# Patient Record
Sex: FEMALE | Race: WHITE | NOT HISPANIC OR LATINO | Employment: FULL TIME | URBAN - METROPOLITAN AREA
[De-identification: names, ages, dates, MRNs, and addresses within clinical notes are randomized per-mention and may not be internally consistent; named-entity substitution may affect disease eponyms.]

---

## 2018-01-18 ENCOUNTER — HOSPITAL ENCOUNTER (OUTPATIENT)
Facility: HOSPITAL | Age: 33
Discharge: HOME | DRG: 661 | End: 2018-01-20
Attending: EMERGENCY MEDICINE | Admitting: HOSPITALIST
Payer: COMMERCIAL

## 2018-01-18 DIAGNOSIS — N13.9 ACUTE UNILATERAL OBSTRUCTIVE UROPATHY: Primary | ICD-10-CM

## 2018-01-18 DIAGNOSIS — N23 RENAL COLIC: ICD-10-CM

## 2018-01-18 PROBLEM — N39.0 UTI (URINARY TRACT INFECTION): Status: ACTIVE | Noted: 2018-01-18

## 2018-01-18 LAB
ALBUMIN SERPL BCP-MCNC: 3.8 G/DL
ALP SERPL-CCNC: 60 U/L
ALT SERPL W/O P-5'-P-CCNC: 9 U/L
ANION GAP SERPL CALC-SCNC: 9 MMOL/L
AST SERPL-CCNC: 13 U/L
B-HCG UR QL: NEGATIVE
BACTERIA #/AREA URNS HPF: ABNORMAL /HPF
BASOPHILS # BLD AUTO: 0 K/UL
BASOPHILS NFR BLD: 0.4 %
BILIRUB SERPL-MCNC: 0.4 MG/DL
BILIRUB UR QL STRIP: NEGATIVE
BUN SERPL-MCNC: 10 MG/DL
CALCIUM SERPL-MCNC: 8.9 MG/DL
CHLORIDE SERPL-SCNC: 108 MMOL/L
CLARITY UR: ABNORMAL
CO2 SERPL-SCNC: 20 MMOL/L
COLOR UR: ABNORMAL
CREAT SERPL-MCNC: 0.9 MG/DL
CTP QC/QA: YES
DIFFERENTIAL METHOD: NORMAL
EOSINOPHIL # BLD AUTO: 0.5 K/UL
EOSINOPHIL NFR BLD: 5.3 %
ERYTHROCYTE [DISTWIDTH] IN BLOOD BY AUTOMATED COUNT: 13.3 %
EST. GFR  (AFRICAN AMERICAN): >60 ML/MIN/1.73 M^2
EST. GFR  (NON AFRICAN AMERICAN): >60 ML/MIN/1.73 M^2
GLUCOSE SERPL-MCNC: 99 MG/DL
GLUCOSE UR QL STRIP: NEGATIVE
HCT VFR BLD AUTO: 38.5 %
HGB BLD-MCNC: 12.9 G/DL
HGB UR QL STRIP: ABNORMAL
HYALINE CASTS #/AREA URNS LPF: 0 /LPF
KETONES UR QL STRIP: ABNORMAL
LEUKOCYTE ESTERASE UR QL STRIP: ABNORMAL
LYMPHOCYTES # BLD AUTO: 2 K/UL
LYMPHOCYTES NFR BLD: 21.1 %
MCH RBC QN AUTO: 30.9 PG
MCHC RBC AUTO-ENTMCNC: 33.5 G/DL
MCV RBC AUTO: 92 FL
MICROSCOPIC COMMENT: ABNORMAL
MONOCYTES # BLD AUTO: 0.5 K/UL
MONOCYTES NFR BLD: 4.7 %
NEUTROPHILS # BLD AUTO: 6.7 K/UL
NEUTROPHILS NFR BLD: 68.5 %
NITRITE UR QL STRIP: POSITIVE
PH UR STRIP: 7 [PH] (ref 5–8)
PLATELET # BLD AUTO: 297 K/UL
PMV BLD AUTO: 9.9 FL
POTASSIUM SERPL-SCNC: 3.6 MMOL/L
PROT SERPL-MCNC: 7.4 G/DL
PROT UR QL STRIP: ABNORMAL
RBC # BLD AUTO: 4.18 M/UL
RBC #/AREA URNS HPF: >100 /HPF (ref 0–4)
SODIUM SERPL-SCNC: 137 MMOL/L
SP GR UR STRIP: 1.02 (ref 1–1.03)
URN SPEC COLLECT METH UR: ABNORMAL
UROBILINOGEN UR STRIP-ACNC: ABNORMAL EU/DL
WBC # BLD AUTO: 9.7 K/UL
WBC #/AREA URNS HPF: 20 /HPF (ref 0–5)

## 2018-01-18 PROCEDURE — 96375 TX/PRO/DX INJ NEW DRUG ADDON: CPT

## 2018-01-18 PROCEDURE — G0378 HOSPITAL OBSERVATION PER HR: HCPCS

## 2018-01-18 PROCEDURE — 80053 COMPREHEN METABOLIC PANEL: CPT

## 2018-01-18 PROCEDURE — 81025 URINE PREGNANCY TEST: CPT | Performed by: EMERGENCY MEDICINE

## 2018-01-18 PROCEDURE — 99222 1ST HOSP IP/OBS MODERATE 55: CPT | Mod: ,,, | Performed by: UROLOGY

## 2018-01-18 PROCEDURE — 63600175 PHARM REV CODE 636 W HCPCS: Performed by: NURSE PRACTITIONER

## 2018-01-18 PROCEDURE — 25000003 PHARM REV CODE 250: Performed by: NURSE PRACTITIONER

## 2018-01-18 PROCEDURE — 25000003 PHARM REV CODE 250: Performed by: EMERGENCY MEDICINE

## 2018-01-18 PROCEDURE — 96365 THER/PROPH/DIAG IV INF INIT: CPT

## 2018-01-18 PROCEDURE — 96376 TX/PRO/DX INJ SAME DRUG ADON: CPT

## 2018-01-18 PROCEDURE — 12000002 HC ACUTE/MED SURGE SEMI-PRIVATE ROOM

## 2018-01-18 PROCEDURE — 99285 EMERGENCY DEPT VISIT HI MDM: CPT | Mod: 25

## 2018-01-18 PROCEDURE — 36415 COLL VENOUS BLD VENIPUNCTURE: CPT

## 2018-01-18 PROCEDURE — 85025 COMPLETE CBC W/AUTO DIFF WBC: CPT

## 2018-01-18 PROCEDURE — 87086 URINE CULTURE/COLONY COUNT: CPT

## 2018-01-18 PROCEDURE — 96361 HYDRATE IV INFUSION ADD-ON: CPT

## 2018-01-18 PROCEDURE — 63600175 PHARM REV CODE 636 W HCPCS: Performed by: EMERGENCY MEDICINE

## 2018-01-18 PROCEDURE — 96367 TX/PROPH/DG ADDL SEQ IV INF: CPT

## 2018-01-18 PROCEDURE — 81000 URINALYSIS NONAUTO W/SCOPE: CPT

## 2018-01-18 RX ORDER — SODIUM CHLORIDE 9 MG/ML
INJECTION, SOLUTION INTRAVENOUS CONTINUOUS
Status: DISCONTINUED | OUTPATIENT
Start: 2018-01-18 | End: 2018-01-20 | Stop reason: HOSPADM

## 2018-01-18 RX ORDER — CEFTRIAXONE 2 G/50ML
2 INJECTION, SOLUTION INTRAVENOUS
Status: COMPLETED | OUTPATIENT
Start: 2018-01-18 | End: 2018-01-18

## 2018-01-18 RX ORDER — KETOROLAC TROMETHAMINE 30 MG/ML
15 INJECTION, SOLUTION INTRAMUSCULAR; INTRAVENOUS
Status: COMPLETED | OUTPATIENT
Start: 2018-01-18 | End: 2018-01-18

## 2018-01-18 RX ORDER — TAMSULOSIN HYDROCHLORIDE 0.4 MG/1
0.4 CAPSULE ORAL
Status: COMPLETED | OUTPATIENT
Start: 2018-01-18 | End: 2018-01-18

## 2018-01-18 RX ORDER — ONDANSETRON 2 MG/ML
4 INJECTION INTRAMUSCULAR; INTRAVENOUS EVERY 6 HOURS PRN
Status: DISCONTINUED | OUTPATIENT
Start: 2018-01-18 | End: 2018-01-20 | Stop reason: HOSPADM

## 2018-01-18 RX ORDER — HYDROMORPHONE HYDROCHLORIDE 2 MG/ML
1 INJECTION, SOLUTION INTRAMUSCULAR; INTRAVENOUS; SUBCUTANEOUS
Status: COMPLETED | OUTPATIENT
Start: 2018-01-18 | End: 2018-01-18

## 2018-01-18 RX ORDER — ONDANSETRON 2 MG/ML
4 INJECTION INTRAMUSCULAR; INTRAVENOUS
Status: COMPLETED | OUTPATIENT
Start: 2018-01-18 | End: 2018-01-18

## 2018-01-18 RX ORDER — KETOROLAC TROMETHAMINE 30 MG/ML
15 INJECTION, SOLUTION INTRAMUSCULAR; INTRAVENOUS EVERY 6 HOURS PRN
Status: DISCONTINUED | OUTPATIENT
Start: 2018-01-18 | End: 2018-01-20 | Stop reason: HOSPADM

## 2018-01-18 RX ORDER — ZOLPIDEM TARTRATE 5 MG/1
5 TABLET ORAL NIGHTLY PRN
Status: DISCONTINUED | OUTPATIENT
Start: 2018-01-18 | End: 2018-01-20 | Stop reason: HOSPADM

## 2018-01-18 RX ORDER — ACETAMINOPHEN 325 MG/1
650 TABLET ORAL EVERY 4 HOURS PRN
Status: DISCONTINUED | OUTPATIENT
Start: 2018-01-18 | End: 2018-01-20 | Stop reason: HOSPADM

## 2018-01-18 RX ORDER — KETOROLAC TROMETHAMINE 30 MG/ML
30 INJECTION, SOLUTION INTRAMUSCULAR; INTRAVENOUS ONCE
Status: COMPLETED | OUTPATIENT
Start: 2018-01-18 | End: 2018-01-18

## 2018-01-18 RX ORDER — KETOROLAC TROMETHAMINE 30 MG/ML
30 INJECTION, SOLUTION INTRAMUSCULAR; INTRAVENOUS EVERY 6 HOURS PRN
Status: DISCONTINUED | OUTPATIENT
Start: 2018-01-18 | End: 2018-01-20 | Stop reason: HOSPADM

## 2018-01-18 RX ADMIN — SODIUM CHLORIDE 1000 ML: 0.9 INJECTION, SOLUTION INTRAVENOUS at 05:01

## 2018-01-18 RX ADMIN — SODIUM CHLORIDE: 0.9 INJECTION, SOLUTION INTRAVENOUS at 10:01

## 2018-01-18 RX ADMIN — KETOROLAC TROMETHAMINE 15 MG: 30 INJECTION, SOLUTION INTRAMUSCULAR at 06:01

## 2018-01-18 RX ADMIN — HYDROMORPHONE HYDROCHLORIDE 1 MG: 2 INJECTION INTRAMUSCULAR; INTRAVENOUS; SUBCUTANEOUS at 05:01

## 2018-01-18 RX ADMIN — KETOROLAC TROMETHAMINE 30 MG: 30 INJECTION, SOLUTION INTRAMUSCULAR at 09:01

## 2018-01-18 RX ADMIN — TAMSULOSIN HYDROCHLORIDE 0.4 MG: 0.4 CAPSULE ORAL at 06:01

## 2018-01-18 RX ADMIN — ONDANSETRON 4 MG: 2 INJECTION INTRAMUSCULAR; INTRAVENOUS at 05:01

## 2018-01-18 RX ADMIN — CEFTRIAXONE 2 G: 2 INJECTION, SOLUTION INTRAVENOUS at 07:01

## 2018-01-18 RX ADMIN — PROMETHAZINE HYDROCHLORIDE 25 MG: 25 INJECTION INTRAMUSCULAR; INTRAVENOUS at 09:01

## 2018-01-18 NOTE — ED PROVIDER NOTES
"Encounter Date: 1/18/2018    SCRIBE #1 NOTE: I, Renetta Ragsdale, am scribing for, and in the presence of,  Dr. Montenegro. I have scribed the entire note.       History     Chief Complaint   Patient presents with    Flank Pain     right side pain started 45 min ago        01/18/2018 5:35 PM     Chief complaint: Right sided flank pain      Tona Cha is a 32 y.o. female with a history of kidney stones who presents to the ED with an onset of right sided flank pain with associated nausea and vomiting that began "45 minutes" PTA. She reports that "2 weeks ago," she experienced abdominal pain but this resolved itself; however, "today something popped" and her flank pain started. The patient thinks that she has a kidney stone. She states that her last kidney stone was "about 3 years ago." Patient reports that she does not drink, do drugs, or smoke. Also, she is not pregnant and is not breast feeding. The patient denies having a fever. NKDA. There are no exacerbating or alleviating factors for her symptoms. No PSHx noted.     HPI limited by active vomiting.       The history is provided by the patient. The history is limited by the condition of the patient.     Review of patient's allergies indicates:  No Known Allergies  Past Medical History:   Diagnosis Date    Renal disorder     kidney stones     No past surgical history on file.  No family history on file.  Social History   Substance Use Topics    Smoking status: Current Every Day Smoker     Packs/day: 0.50    Smokeless tobacco: Not on file    Alcohol use Yes      Comment: occasional     Review of Systems   Unable to perform ROS: Other (active vomiting)   Constitutional: Negative for fever.   Gastrointestinal: Positive for nausea and vomiting.   Genitourinary: Positive for flank pain (right sided).       Physical Exam     Initial Vitals [01/18/18 1714]   BP Pulse Resp Temp SpO2   119/65 83 20 97.9 °F (36.6 °C) 100 %      MAP       83         Physical " Exam    Nursing note and vitals reviewed.  Constitutional: She appears well-developed and well-nourished. She is not diaphoretic. She appears distressed (vomiting).   HENT:   Head: Normocephalic and atraumatic.   Eyes: EOM are normal.   Neck: Normal range of motion. Neck supple.   Cardiovascular: Normal rate, regular rhythm and normal heart sounds. Exam reveals no gallop and no friction rub.    No murmur heard.  Pulmonary/Chest: Breath sounds normal. No respiratory distress. She has no wheezes. She has no rhonchi. She has no rales.   Abdominal: She exhibits no distension. There is no tenderness. There is no rebound.   Musculoskeletal: Normal range of motion.   Neurological: She is alert and oriented to person, place, and time.   Skin: Skin is warm and dry.   Psychiatric: She has a normal mood and affect. Her behavior is normal. Judgment and thought content normal.         ED Course   Procedures  Labs Reviewed   COMPREHENSIVE METABOLIC PANEL - Abnormal; Notable for the following:        Result Value    CO2 20 (*)     ALT 9 (*)     All other components within normal limits   URINALYSIS - Abnormal; Notable for the following:     Color, UA Red (*)     Appearance, UA Cloudy (*)     Protein, UA 3+ (*)     Ketones, UA 1+ (*)     Occult Blood UA 3+ (*)     Nitrite, UA Positive (*)     Urobilinogen, UA 4.0-6.0 (*)     Leukocytes, UA 2+ (*)     All other components within normal limits   URINALYSIS MICROSCOPIC - Abnormal; Notable for the following:     RBC, UA >100 (*)     WBC, UA 20 (*)     All other components within normal limits   POCT URINE PREGNANCY - Normal   CULTURE, URINE   CBC W/ AUTO DIFFERENTIAL        Imaging Results          CT Renal Stone Study ABD Pelvis WO (In process)                X-Ray Abdomen AP 1 View (KUB) (In process)                    Medical Decision Making:   History:   Old Medical Records: I decided to obtain old medical records.  Clinical Tests:   Lab Tests: Reviewed and Ordered  Radiological  Study: Reviewed and Ordered            Scribe Attestation:   Scribe #1: I performed the above scribed service and the documentation accurately describes the services I performed. I attest to the accuracy of the note.    I, Dr. Montenegro, personally performed the services described in this documentation. All medical record entries made by the scribe were at my direction and in my presence.  I have reviewed the chart and agree that the record reflects my personal performance and is accurate and complete.10:50 PM 01/18/2018            ED Course as of Jan 18 2117   Thu Jan 18, 2018 1910 Pain gone after Toradol  [EF]   1910 Nitrite, UA: (!) Positive [EF]   1910 Urobilinogen, UA: (!) 4.0-6.0 [EF]   1910 Leukocytes, UA: (!) 2+ [EF]   1911 Urine appears infected, patient will be given IV Rocephin and a CT scan will be obtained  [EF]   2016 Case discussed with Dr. Barber urology who will see her in the emergency room since he is here seeing another patient.  [EF]   2036 Kidneys and ureters: Obstructing 0.5 x 0.6 cm stone within the distal right ureter, resulting in mild  hydroureteronephrosis  [EF]   2039 [facility] medicine to admit patient, nothing by mouth after midnight, seen by urology in the ER  [EF]      ED Course User Index  [EF] Toby Montenegro MD     Clinical Impression:     1. Renal colic          Disposition:   Disposition: Admitted         32-year-old with a history of kidney stone presents to the ER with right flank pain.  Vomiting on arrival, pain resolved after Toradol.  Seen by Dr. Barber in the emergency room.  Patient has an obstructing kidney stone which is infected, no sign of sepsis.  She'll be admitted to hospital medicine.               Toby Montenegro MD  01/18/18 8653

## 2018-01-19 ENCOUNTER — ANESTHESIA (OUTPATIENT)
Dept: SURGERY | Facility: HOSPITAL | Age: 33
DRG: 661 | End: 2018-01-19
Payer: COMMERCIAL

## 2018-01-19 ENCOUNTER — ANESTHESIA EVENT (OUTPATIENT)
Dept: SURGERY | Facility: HOSPITAL | Age: 33
DRG: 661 | End: 2018-01-19
Payer: COMMERCIAL

## 2018-01-19 LAB
ANION GAP SERPL CALC-SCNC: 6 MMOL/L
BACTERIA UR CULT: NORMAL
BACTERIA UR CULT: NORMAL
BASOPHILS # BLD AUTO: 0.1 K/UL
BASOPHILS NFR BLD: 0.9 %
BUN SERPL-MCNC: 12 MG/DL
CALCIUM SERPL-MCNC: 8.1 MG/DL
CHLORIDE SERPL-SCNC: 111 MMOL/L
CO2 SERPL-SCNC: 22 MMOL/L
CREAT SERPL-MCNC: 0.9 MG/DL
DIFFERENTIAL METHOD: ABNORMAL
EOSINOPHIL # BLD AUTO: 0.4 K/UL
EOSINOPHIL NFR BLD: 4.7 %
ERYTHROCYTE [DISTWIDTH] IN BLOOD BY AUTOMATED COUNT: 13 %
EST. GFR  (AFRICAN AMERICAN): >60 ML/MIN/1.73 M^2
EST. GFR  (NON AFRICAN AMERICAN): >60 ML/MIN/1.73 M^2
GLUCOSE SERPL-MCNC: 95 MG/DL
HCT VFR BLD AUTO: 35.1 %
HGB BLD-MCNC: 12.1 G/DL
LYMPHOCYTES # BLD AUTO: 1.9 K/UL
LYMPHOCYTES NFR BLD: 21.7 %
MCH RBC QN AUTO: 31.9 PG
MCHC RBC AUTO-ENTMCNC: 34.4 G/DL
MCV RBC AUTO: 93 FL
MONOCYTES # BLD AUTO: 0.7 K/UL
MONOCYTES NFR BLD: 8.1 %
NEUTROPHILS # BLD AUTO: 5.7 K/UL
NEUTROPHILS NFR BLD: 64.6 %
PLATELET # BLD AUTO: 287 K/UL
PMV BLD AUTO: 10 FL
POTASSIUM SERPL-SCNC: 4 MMOL/L
RBC # BLD AUTO: 3.78 M/UL
SODIUM SERPL-SCNC: 139 MMOL/L
WBC # BLD AUTO: 8.8 K/UL

## 2018-01-19 PROCEDURE — 36415 COLL VENOUS BLD VENIPUNCTURE: CPT

## 2018-01-19 PROCEDURE — 52352 CYSTOURETERO W/STONE REMOVE: CPT | Mod: RT,,, | Performed by: UROLOGY

## 2018-01-19 PROCEDURE — G0378 HOSPITAL OBSERVATION PER HR: HCPCS

## 2018-01-19 PROCEDURE — 63600175 PHARM REV CODE 636 W HCPCS: Performed by: NURSE PRACTITIONER

## 2018-01-19 PROCEDURE — 25000003 PHARM REV CODE 250: Performed by: UROLOGY

## 2018-01-19 PROCEDURE — 80048 BASIC METABOLIC PNL TOTAL CA: CPT

## 2018-01-19 PROCEDURE — C1758 CATHETER, URETERAL: HCPCS | Performed by: UROLOGY

## 2018-01-19 PROCEDURE — 63600175 PHARM REV CODE 636 W HCPCS: Performed by: UROLOGY

## 2018-01-19 PROCEDURE — 99900103 DSU ONLY-NO CHARGE-INITIAL HR (STAT): Performed by: UROLOGY

## 2018-01-19 PROCEDURE — 36000707: Performed by: UROLOGY

## 2018-01-19 PROCEDURE — 82365 CALCULUS SPECTROSCOPY: CPT

## 2018-01-19 PROCEDURE — 37000009 HC ANESTHESIA EA ADD 15 MINS: Performed by: UROLOGY

## 2018-01-19 PROCEDURE — 71000033 HC RECOVERY, INTIAL HOUR: Performed by: UROLOGY

## 2018-01-19 PROCEDURE — C1769 GUIDE WIRE: HCPCS | Performed by: UROLOGY

## 2018-01-19 PROCEDURE — 12000002 HC ACUTE/MED SURGE SEMI-PRIVATE ROOM

## 2018-01-19 PROCEDURE — 74420 UROGRAPHY RTRGR +-KUB: CPT | Mod: 26,,, | Performed by: UROLOGY

## 2018-01-19 PROCEDURE — 85025 COMPLETE CBC W/AUTO DIFF WBC: CPT

## 2018-01-19 PROCEDURE — C1773 RET DEV, INSERTABLE: HCPCS | Performed by: UROLOGY

## 2018-01-19 PROCEDURE — 63600175 PHARM REV CODE 636 W HCPCS: Performed by: NURSE ANESTHETIST, CERTIFIED REGISTERED

## 2018-01-19 PROCEDURE — D9220A PRA ANESTHESIA: Mod: ANES,,, | Performed by: ANESTHESIOLOGY

## 2018-01-19 PROCEDURE — 71000039 HC RECOVERY, EACH ADD'L HOUR: Performed by: UROLOGY

## 2018-01-19 PROCEDURE — 37000008 HC ANESTHESIA 1ST 15 MINUTES: Performed by: UROLOGY

## 2018-01-19 PROCEDURE — 25000003 PHARM REV CODE 250: Performed by: ANESTHESIOLOGY

## 2018-01-19 PROCEDURE — 25500020 PHARM REV CODE 255: Performed by: UROLOGY

## 2018-01-19 PROCEDURE — 25000003 PHARM REV CODE 250: Performed by: NURSE PRACTITIONER

## 2018-01-19 PROCEDURE — 27200651 HC AIRWAY, LMA: Performed by: NURSE ANESTHETIST, CERTIFIED REGISTERED

## 2018-01-19 PROCEDURE — 99900104 DSU ONLY-NO CHARGE-EA ADD'L HR (STAT): Performed by: UROLOGY

## 2018-01-19 PROCEDURE — C1726 CATH, BAL DIL, NON-VASCULAR: HCPCS | Performed by: UROLOGY

## 2018-01-19 PROCEDURE — 36000706: Performed by: UROLOGY

## 2018-01-19 PROCEDURE — D9220A PRA ANESTHESIA: Mod: CRNA,,, | Performed by: NURSE ANESTHETIST, CERTIFIED REGISTERED

## 2018-01-19 RX ORDER — FENTANYL CITRATE 50 UG/ML
INJECTION, SOLUTION INTRAMUSCULAR; INTRAVENOUS
Status: DISCONTINUED | OUTPATIENT
Start: 2018-01-19 | End: 2018-01-19

## 2018-01-19 RX ORDER — ONDANSETRON 2 MG/ML
INJECTION INTRAMUSCULAR; INTRAVENOUS
Status: DISCONTINUED | OUTPATIENT
Start: 2018-01-19 | End: 2018-01-19

## 2018-01-19 RX ORDER — LIDOCAINE HYDROCHLORIDE 20 MG/ML
JELLY TOPICAL
Status: DISCONTINUED | OUTPATIENT
Start: 2018-01-19 | End: 2018-01-19 | Stop reason: HOSPADM

## 2018-01-19 RX ORDER — SODIUM CHLORIDE, SODIUM LACTATE, POTASSIUM CHLORIDE, CALCIUM CHLORIDE 600; 310; 30; 20 MG/100ML; MG/100ML; MG/100ML; MG/100ML
INJECTION, SOLUTION INTRAVENOUS CONTINUOUS
Status: DISCONTINUED | OUTPATIENT
Start: 2018-01-19 | End: 2018-01-20

## 2018-01-19 RX ORDER — DEXAMETHASONE SODIUM PHOSPHATE 4 MG/ML
INJECTION, SOLUTION INTRA-ARTICULAR; INTRALESIONAL; INTRAMUSCULAR; INTRAVENOUS; SOFT TISSUE
Status: DISCONTINUED | OUTPATIENT
Start: 2018-01-19 | End: 2018-01-19

## 2018-01-19 RX ORDER — LIDOCAINE HYDROCHLORIDE 10 MG/ML
5 INJECTION, SOLUTION EPIDURAL; INFILTRATION; INTRACAUDAL; PERINEURAL ONCE
Status: DISCONTINUED | OUTPATIENT
Start: 2018-01-19 | End: 2018-01-19 | Stop reason: HOSPADM

## 2018-01-19 RX ORDER — PROPOFOL 10 MG/ML
VIAL (ML) INTRAVENOUS
Status: DISCONTINUED | OUTPATIENT
Start: 2018-01-19 | End: 2018-01-19

## 2018-01-19 RX ORDER — IODIXANOL 320 MG/ML
INJECTION, SOLUTION INTRAVASCULAR
Status: DISCONTINUED | OUTPATIENT
Start: 2018-01-19 | End: 2018-01-19 | Stop reason: HOSPADM

## 2018-01-19 RX ORDER — OXYCODONE HYDROCHLORIDE 5 MG/1
5 TABLET ORAL ONCE AS NEEDED
Status: DISCONTINUED | OUTPATIENT
Start: 2018-01-19 | End: 2018-01-19 | Stop reason: HOSPADM

## 2018-01-19 RX ORDER — FENTANYL CITRATE 50 UG/ML
25 INJECTION, SOLUTION INTRAMUSCULAR; INTRAVENOUS EVERY 5 MIN PRN
Status: DISCONTINUED | OUTPATIENT
Start: 2018-01-19 | End: 2018-01-19 | Stop reason: HOSPADM

## 2018-01-19 RX ORDER — LIDOCAINE HCL/PF 100 MG/5ML
SYRINGE (ML) INTRAVENOUS
Status: DISCONTINUED | OUTPATIENT
Start: 2018-01-19 | End: 2018-01-19

## 2018-01-19 RX ORDER — CEFTRIAXONE 1 G/50ML
1 INJECTION, SOLUTION INTRAVENOUS ONCE
Status: COMPLETED | OUTPATIENT
Start: 2018-01-19 | End: 2018-01-19

## 2018-01-19 RX ORDER — MIDAZOLAM HYDROCHLORIDE 1 MG/ML
INJECTION, SOLUTION INTRAMUSCULAR; INTRAVENOUS
Status: DISCONTINUED | OUTPATIENT
Start: 2018-01-19 | End: 2018-01-19

## 2018-01-19 RX ADMIN — ONDANSETRON 4 MG: 2 INJECTION, SOLUTION INTRAMUSCULAR; INTRAVENOUS at 03:01

## 2018-01-19 RX ADMIN — FENTANYL CITRATE 50 MCG: 50 INJECTION, SOLUTION INTRAMUSCULAR; INTRAVENOUS at 03:01

## 2018-01-19 RX ADMIN — LIDOCAINE HYDROCHLORIDE 100 MG: 20 INJECTION, SOLUTION INTRAVENOUS at 03:01

## 2018-01-19 RX ADMIN — SODIUM CHLORIDE, SODIUM LACTATE, POTASSIUM CHLORIDE, AND CALCIUM CHLORIDE 50 ML: .6; .31; .03; .02 INJECTION, SOLUTION INTRAVENOUS at 03:01

## 2018-01-19 RX ADMIN — MIDAZOLAM 2 MG: 1 INJECTION INTRAMUSCULAR; INTRAVENOUS at 03:01

## 2018-01-19 RX ADMIN — FENTANYL CITRATE 25 MCG: 50 INJECTION, SOLUTION INTRAMUSCULAR; INTRAVENOUS at 03:01

## 2018-01-19 RX ADMIN — PROPOFOL 200 MG: 10 INJECTION, EMULSION INTRAVENOUS at 03:01

## 2018-01-19 RX ADMIN — KETOROLAC TROMETHAMINE 30 MG: 30 INJECTION, SOLUTION INTRAMUSCULAR at 01:01

## 2018-01-19 RX ADMIN — DEXAMETHASONE SODIUM PHOSPHATE 8 MG: 4 INJECTION, SOLUTION INTRAMUSCULAR; INTRAVENOUS at 03:01

## 2018-01-19 RX ADMIN — KETOROLAC TROMETHAMINE 30 MG: 30 INJECTION, SOLUTION INTRAMUSCULAR at 07:01

## 2018-01-19 RX ADMIN — NAPHAZOLINE HYDROCHLORIDE AND PHENIRAMINE MALEATE 1 DROP: .25; 3 SOLUTION/ DROPS OPHTHALMIC at 10:01

## 2018-01-19 RX ADMIN — CEFTRIAXONE 1 G: 1 INJECTION, SOLUTION INTRAVENOUS at 03:01

## 2018-01-19 NOTE — CONSULTS
DATE OF CONSULTATION:  01/18/2018.    CHIEF COMPLAINT:  Right ureteral stone.    HISTORY OF PRESENT ILLNESS:  Ms. Cha is a 32-year-old female who started   experiencing right lower quadrant pain and right flank pain associated with   nausea and vomiting today in the morning.  The pain became severe and she   consulted the Emergency Room.  The evaluation in the Emergency Room revealed   that the patient has lower third ureteral stone with mild grade of obstruction   and evidence of urinary tract infection.  The patient has been admitted to the   hospital for further evaluation and treatment.    The patient referred that she had passed many kidney stones in the past.  She   never underwent any procedures for kidney stones.  She also never had any   evaluation done regarding her urolithiasis.    PAST MEDICAL HISTORY:  Essentially negative.    PAST SURGICAL HISTORY:  Also negative.    MEDICATIONS:  The patient takes no medications at home.    ALLERGIES:  The patient has no known drug allergies.    REVIEW OF SYSTEMS:  DERMATOLOGIC:  Denies any skin conditions or cancers.  CARDIOVASCULAR:  Denies chest pain or palpitations.  GASTROINTESTINAL:  Nausea associated with the pain and vomiting.  Denies any   diarrhea or changes in her bowel habits.  RESPIRATORY:  The patient denies any chronic cough or shortness of breath.  MUSCULOSKELETAL:  Unremarkable.  GENITOURINARY:  As per history.  To be noted that the patient has no previous   history of recurrent urinary tract infections.    PHYSICAL EXAMINATION:  GENERAL:  Reveals a patient in mild distress because of pain.  She is alert and   is cooperative with the history.  HEAD AND NECK:  Normocephalic and atraumatic.  RESPIRATORY:  The lungs are clear to auscultation and percussion.  HEART:  Regular rhythm.  The peripheral pulses are palpable in both sides within   normal limits.  ABDOMEN:  Soft.  There are no masses palpable in the abdomen.  The patient has   severe CVA  tenderness on the right.  There is no CVA tenderness on the left.    The percussion of the spine is painless.  MUSCULOSKELETAL:  Unremarkable.  GENITAL:  The genital examination was not done.    FINAL IMPRESSION:  Right lower third ureteral stone associated with the urinary   tract infection.    PLAN AND RECOMMENDATIONS:  The patient is going to be admitted by the   Hospitalist Service and I suggest that we take a KUB film tomorrow in order to   determine if the stone has progressed or passed.  If not, she may require   tomorrow cystoscopy with ureteroscopy and a stone extraction.    I thank you for letting me participate in the care of your patient and I will be   following the patient with you.      EOR/HN  dd: 01/18/2018 20:36:24 (CST)  td: 01/18/2018 23:31:16 (CST)  Doc ID   #[id number]  Job ID #[id number]    CC:

## 2018-01-19 NOTE — ANESTHESIA PREPROCEDURE EVALUATION
01/19/2018  Tona Cha is a 32 y.o., female.    Anesthesia Evaluation      I have reviewed the Medications.     Review of Systems  Anesthesia Hx:  No problems with previous Anesthesia   Social:  Smoker    Cardiovascular:  Cardiovascular Normal     Pulmonary:  Pulmonary Normal    Renal/:   renal calculi    Hepatic/GI:  Hepatic/GI Normal    Neurological:  Neurology Normal    Endocrine:  Endocrine Normal        Physical Exam  General:  Obesity    Airway/Jaw/Neck:  Airway Findings: Mouth Opening: Normal Tongue: Normal  General Airway Assessment: Adult, Average  Mallampati: II  Jaw/Neck Findings:  Neck ROM: Normal ROM       Chest/Lungs:  Chest/Lungs Findings: Clear to auscultation, Normal Respiratory Rate     Heart/Vascular:  Heart Findings: Rate: Normal  Rhythm: Regular Rhythm  Sounds: Normal  Heart murmur: negative       Mental Status:  Mental Status Findings:  Cooperative, Alert and Oriented         Anesthesia Plan  Type of Anesthesia, risks & benefits discussed:  Anesthesia Type:  general  Patient's Preference:   Intra-op Monitoring Plan:   Intra-op Monitoring Plan Comments:   Post Op Pain Control Plan:   Post Op Pain Control Plan Comments:   Induction:   IV  Beta Blocker:  Patient is not currently on a Beta-Blocker (No further documentation required).       Informed Consent: Patient understands risks and agrees with Anesthesia plan.  Questions answered. Anesthesia consent signed with patient.  ASA Score: 2     Day of Surgery Review of History & Physical:        Anesthesia Plan Notes: LMA general        Ready For Surgery From Anesthesia Perspective.

## 2018-01-19 NOTE — BRIEF OP NOTE
Operative Note       Surgery Date: 1/19/18    Surgeon(s) and Role:     * Toño Barber MD - Primary    Pre-op Diagnosis:  Right ureteral stone  Post-op Diagnosis:  same    Procedure(s) : Cystoscopy, retrograde, right ureteroscopy and stone extraction      Anesthesia: general    Findings/Key Components: Right ureteral stone       Core Measure Documentation:  Were antibiotics extended? Yes uti  Was the patient administered a VTE Prophylaxis? Yes    Specimens  Right ureteral; stone                Blood/Blood Products: 0 mL             Blood Loss: minimal          Drains: Urinary Catheter ([place])              Complications: * No complications entered in OR log *           Disposition: PACU - hemodynamically stable.           Condition: Good    Attestation:  I was present and scrubbed for the entire procedure.

## 2018-01-19 NOTE — H&P
[facility] Hospital Medicine  History & Physical    Patient Name: Toan Cha  MRN: [id number]  Admission Date: 1/18/2018  Attending Physician: Sania Henderson MD   Primary Care Provider: Primary Doctor No         Patient information was obtained from patient and ER records.     Subjective:     Principal Problem:Acute unilateral obstructive uropathy    Chief Complaint:   Chief Complaint   Patient presents with    Flank Pain     right side pain started 45 min ago         HPI: Ms. Cha is a 31yo F with a PMH of kidney stones. She presents to the ED today with c/o right sided flank pain. Associated symptoms include N&V. Symptoms started about 45 minutes before coming to the hospital. While in the ED, an abdominal CT was done and showed a right ureteral stone and her UA was (+) UTI. IV Rocephin was initiated. Dr. Barber was already in the ED and evaluated her. She currently c/o right flank pain and nausea. The dilaudid did not help, but the toradol did.    Past Medical History:   Diagnosis Date    Renal disorder     kidney stones       No past surgical history on file.    Review of patient's allergies indicates:  No Known Allergies    No current facility-administered medications on file prior to encounter.      Current Outpatient Prescriptions on File Prior to Encounter   Medication Sig    ibuprofen (ADVIL,MOTRIN) 600 MG tablet Take 1 tablet (600 mg total) by mouth every 6 (six) hours as needed for Pain.     Family History     None        Social History Main Topics    Smoking status: Current Every Day Smoker     Packs/day: 0.50    Smokeless tobacco: Not on file    Alcohol use Yes      Comment: occasional    Drug use: No    Sexual activity: Yes     Partners: Male     Review of Systems   Constitutional: Negative for chills and fever.   HENT: Negative for congestion.    Eyes: Negative for visual disturbance.   Respiratory: Negative for cough and shortness of breath.    Cardiovascular:  Negative for chest pain and palpitations.   Gastrointestinal: Positive for nausea and vomiting. Negative for abdominal pain.   Genitourinary: Positive for flank pain. Negative for dysuria and hematuria.   Musculoskeletal: Negative for arthralgias.   Skin: Negative for wound.   Neurological: Negative for dizziness, syncope and headaches.   Psychiatric/Behavioral: Negative for confusion and hallucinations.     Objective:     Vital Signs (Most Recent):  Temp: 97.7 °F (36.5 °C) (01/18/18 2217)  Pulse: 73 (01/18/18 2217)  Resp: 20 (01/18/18 1714)  BP: 118/65 (01/18/18 2217)  SpO2: 97 % (01/18/18 2217) Vital Signs (24h Range):  Temp:  [97.7 °F (36.5 °C)-97.9 °F (36.6 °C)] 97.7 °F (36.5 °C)  Pulse:  [60-83] 73  Resp:  [20] 20  SpO2:  [97 %-100 %] 97 %  BP: (113-119)/(65-70) 118/65     Weight: 97 kg (213 lb 13.5 oz)  Body mass index is 31.58 kg/m².    Physical Exam   Constitutional: She is oriented to person, place, and time. She appears distressed.   Moderately distressed due to pain   HENT:   Head: Normocephalic.   Eyes: Pupils are equal, round, and reactive to light.   Neck: Normal range of motion. Neck supple. No JVD present. No tracheal deviation present.   Cardiovascular: Normal rate, regular rhythm, normal heart sounds and intact distal pulses.    Pulmonary/Chest: Effort normal and breath sounds normal. No respiratory distress.   Abdominal: Soft. Bowel sounds are normal. She exhibits no distension. There is no tenderness. There is CVA tenderness.   Right CVA tenderness   Neurological: She is alert and oriented to person, place, and time. No cranial nerve deficit.   Skin: Skin is warm and dry. Capillary refill takes less than 2 seconds.   Psychiatric: She has a normal mood and affect. Her behavior is normal. Judgment and thought content normal.         CRANIAL NERVES     CN III, IV, VI   Pupils are equal, round, and reactive to light.       Significant Labs:   CBC:   Recent Labs  Lab 01/18/18  1745   WBC 9.70   HGB  12.9   HCT 38.5        CMP:   Recent Labs  Lab 01/18/18  1745      K 3.6      CO2 20*   GLU 99   BUN 10   CREATININE 0.9   CALCIUM 8.9   PROT 7.4   ALBUMIN 3.8   BILITOT 0.4   ALKPHOS 60   AST 13   ALT 9*   ANIONGAP 9   EGFRNONAA >60     Urine Studies:   Recent Labs  Lab 01/18/18 1833   COLORU Red*   APPEARANCEUA Cloudy*   PHUR 7.0   SPECGRAV 1.025   PROTEINUA 3+*   GLUCUA Negative   KETONESU 1+*   BILIRUBINUA Negative   OCCULTUA 3+*   NITRITE Positive*   UROBILINOGEN 4.0-6.0*   LEUKOCYTESUR 2+*   RBCUA >100*   WBCUA 20*   BACTERIA None   HYALINECASTS 0     Urine PG: negative    Microbiology Results (last 7 days)     Procedure Component Value Units Date/Time    Urine culture [[id number]] Collected:  01/18/18 1833    Order Status:  Sent Specimen:  Urine from Urine, Clean Catch Updated:  01/18/18 2152            Significant Imaging:     Abdominal Xray: Reviewed film, looks ok.    CT Abd/pelvis renal Stone protocol: Reviewed report-- Obstructing 0.5 x 0.6 cm stone within the distal right ureter, resulting in mild  hydroureteronephrosis     Assessment/Plan:     * Acute unilateral obstructive uropathy with Hydronephrosis    Urine culture collected  IV rocephin  Consult Dr. Barber  NPO after midnight for possible ureteral stent placement  IV pain management and antiemetics          UTI (urinary tract infection)    Due to stone  IV Rocephin  Urine cultured in ED            VTE Risk Mitigation         Ordered     Low Risk of VTE  Once      01/18/18 2207             Alba Nolasco NP  Department of Hospital Medicine   [facility]

## 2018-01-19 NOTE — SUBJECTIVE & OBJECTIVE
Past Medical History:   Diagnosis Date    Renal disorder     kidney stones       No past surgical history on file.    Review of patient's allergies indicates:  No Known Allergies    No current facility-administered medications on file prior to encounter.      Current Outpatient Prescriptions on File Prior to Encounter   Medication Sig    ibuprofen (ADVIL,MOTRIN) 600 MG tablet Take 1 tablet (600 mg total) by mouth every 6 (six) hours as needed for Pain.     Family History     None        Social History Main Topics    Smoking status: Current Every Day Smoker     Packs/day: 0.50    Smokeless tobacco: Not on file    Alcohol use Yes      Comment: occasional    Drug use: No    Sexual activity: Yes     Partners: Male     Review of Systems   Constitutional: Negative for chills and fever.   HENT: Negative for congestion.    Eyes: Negative for visual disturbance.   Respiratory: Negative for cough and shortness of breath.    Cardiovascular: Negative for chest pain and palpitations.   Gastrointestinal: Positive for nausea and vomiting. Negative for abdominal pain.   Genitourinary: Positive for flank pain. Negative for dysuria and hematuria.   Musculoskeletal: Negative for arthralgias.   Skin: Negative for wound.   Neurological: Negative for dizziness, syncope and headaches.   Psychiatric/Behavioral: Negative for confusion and hallucinations.     Objective:     Vital Signs (Most Recent):  Temp: 97.7 °F (36.5 °C) (01/18/18 2217)  Pulse: 73 (01/18/18 2217)  Resp: 20 (01/18/18 1714)  BP: 118/65 (01/18/18 2217)  SpO2: 97 % (01/18/18 2217) Vital Signs (24h Range):  Temp:  [97.7 °F (36.5 °C)-97.9 °F (36.6 °C)] 97.7 °F (36.5 °C)  Pulse:  [60-83] 73  Resp:  [20] 20  SpO2:  [97 %-100 %] 97 %  BP: (113-119)/(65-70) 118/65     Weight: 97 kg (213 lb 13.5 oz)  Body mass index is 31.58 kg/m².    Physical Exam   Constitutional: She is oriented to person, place, and time. She appears distressed.   Moderately distressed due to pain    HENT:   Head: Normocephalic.   Eyes: Pupils are equal, round, and reactive to light.   Neck: Normal range of motion. Neck supple. No JVD present. No tracheal deviation present.   Cardiovascular: Normal rate, regular rhythm, normal heart sounds and intact distal pulses.    Pulmonary/Chest: Effort normal and breath sounds normal. No respiratory distress.   Abdominal: Soft. Bowel sounds are normal. She exhibits no distension. There is no tenderness. There is CVA tenderness.   Right CVA tenderness   Neurological: She is alert and oriented to person, place, and time. No cranial nerve deficit.   Skin: Skin is warm and dry. Capillary refill takes less than 2 seconds.   Psychiatric: She has a normal mood and affect. Her behavior is normal. Judgment and thought content normal.         CRANIAL NERVES     CN III, IV, VI   Pupils are equal, round, and reactive to light.       Significant Labs:   CBC:   Recent Labs  Lab 01/18/18  1745   WBC 9.70   HGB 12.9   HCT 38.5        CMP:   Recent Labs  Lab 01/18/18  1745      K 3.6      CO2 20*   GLU 99   BUN 10   CREATININE 0.9   CALCIUM 8.9   PROT 7.4   ALBUMIN 3.8   BILITOT 0.4   ALKPHOS 60   AST 13   ALT 9*   ANIONGAP 9   EGFRNONAA >60     Urine Studies:   Recent Labs  Lab 01/18/18 1833   COLORU Red*   APPEARANCEUA Cloudy*   PHUR 7.0   SPECGRAV 1.025   PROTEINUA 3+*   GLUCUA Negative   KETONESU 1+*   BILIRUBINUA Negative   OCCULTUA 3+*   NITRITE Positive*   UROBILINOGEN 4.0-6.0*   LEUKOCYTESUR 2+*   RBCUA >100*   WBCUA 20*   BACTERIA None   HYALINECASTS 0     Urine PG: negative    Microbiology Results (last 7 days)     Procedure Component Value Units Date/Time    Urine culture [[id number]] Collected:  01/18/18 1833    Order Status:  Sent Specimen:  Urine from Urine, Clean Catch Updated:  01/18/18 2152            Significant Imaging:     Abdominal Xray: Reviewed film, looks ok.    CT Abd/pelvis renal Stone protocol: Reviewed radiologist's report-- Obstructing 0.5  x 0.6 cm stone within the distal right ureter, resulting in mild  hydroureteronephrosis

## 2018-01-19 NOTE — ED NOTES
Pt here for eval of right flank pain since Monday. Pain got worse this morning. Pt reports 10/10 pain since earlier today with nausea and vomiting. Denies fever or chills. Pt has hx of kidney stones.

## 2018-01-19 NOTE — PLAN OF CARE
Met with pt to complete her assessment.  Educated pt on the blue discharge folder and left the folder in the room.  Pt, who is employed, lives with her spouse and 3 dependent children.  She verified her insurance as [facility] and denies having a PCP stating that she goes to Urgent Care if she needs to see a physician.  Pt' discharge disposition is home with no anticipated needs.       01/19/18 0825   Discharge Assessment   Assessment Type Discharge Planning Assessment   Confirmed/corrected address and phone number on facesheet? Yes   Assessment information obtained from? Patient   Prior to hospitilization cognitive status: Alert/Oriented   Prior to hospitalization functional status: Independent   Current cognitive status: Alert/Oriented   Current Functional Status: Independent   Lives With child(jose), dependent;spouse  (spouse and 3 children aged 12, 7 and 18 months. )   Able to Return to Prior Arrangements yes   Is patient able to care for self after discharge? Yes   Who are your caregiver(s) and their phone number(s)? (spouse [phone])   Patient's perception of discharge disposition home or selfcare   Readmission Within The Last 30 Days no previous admission in last 30 days   Patient currently being followed by outpatient case management? No   Patient currently receives any other outside agency services? No   Equipment Currently Used at Home none   Do you have any problems affording any of your prescribed medications? No  (pharmacy is [facility]  )   Is the patient taking medications as prescribed? (Pt stated that she is not on any meds at this time.)   Does the patient have transportation home? Yes   Transportation Available family or friend will provide   Does the patient receive services at the [facility]? No   Discharge Plan A Home with family   Patient/Family In Agreement With Plan yes

## 2018-01-19 NOTE — ASSESSMENT & PLAN NOTE
Urine culture collected  IV rocephin  Consult Dr. Barber  NPO after midnight for possible ureteral stent placement  IV pain management and antiemetics

## 2018-01-19 NOTE — TRANSFER OF CARE
"Anesthesia Transfer of Care Note    Patient: Tona Cha    Procedure(s) Performed: Procedure(s) (LRB):  EXTRACTION-STONE-URETEROSCOPY (Right)  PYELOGRAM-RETROGRADE (Right)    Patient location: PACU    Anesthesia Type: general    Transport from OR: Transported from OR on 2-3 L/min O2 by NC with adequate spontaneous ventilation    Post pain: adequate analgesia    Post assessment: no apparent anesthetic complications and tolerated procedure well    Post vital signs: stable    Level of consciousness: awake    Nausea/Vomiting: no nausea/vomiting    Complications: none    Transfer of care protocol was followed      Last vitals:   Visit Vitals  /76 (BP Location: Left arm, Patient Position: Sitting)   Pulse 81   Temp 36.6 °C (97.8 °F) (Skin)   Resp 16   Ht 5' 9" (1.753 m)   Wt 96.6 kg (213 lb)   LMP 01/17/2018   SpO2 97%   Breastfeeding? No   BMI 31.45 kg/m²     "

## 2018-01-19 NOTE — PLAN OF CARE
Pt in pre op transferred viaq wheelchair frofm room 419.  Repiort received from Thierry on 4th floor.  Denies pain states toradol has been helping given on floor. Has 20 jelco in right AC fluids infusing without diff at KVO  Pt is on her monthly cycle, pad placed on bed. Underpants and rings brought to PACU so pt can have when goes back to floor.  Will contimue to monitor  Dr joshi at bedside getting consent signed and ordered 1 gram rocephin pre op on call  Called milla her friend on fourth floor wainting in pts room. She wildl come down to collect pt belongings (panties and rings) and will stay in surgery waiting room so dr joshi can speak to her after surgery  Milla at bedside gave pts belongsing to her.

## 2018-01-19 NOTE — PROGRESS NOTES
Patient underwent a stone extraction. The childress catheter can be removed tomoroww morning as per orders and she can go home in AM. I like to follow her as OP in 2 to 3 weeks. Thanks

## 2018-01-19 NOTE — HPI
Ms. Cha is a 33yo F with a PMH of kidney stones. She presents to the ED today with c/o right sided flank pain. Associated symptoms include N&V. Symptoms started about 45 minutes before coming to the hospital. While in the ED, an abdominal CT was done and showed a right ureteral stone and her UA was (+) UTI. IV Rocephin was initiated. Dr. Barber was already in the ED and evaluated her. She currently c/o right flank pain and nausea. The dilaudid did not help, but the toradol did.

## 2018-01-19 NOTE — PLAN OF CARE
Dr de la garza into pacu to release pt to floor Report to torin ellison pt hf in bed no co of pain has childress cath with stent draining cherry colored urine

## 2018-01-20 VITALS
HEART RATE: 81 BPM | BODY MASS INDEX: 31.55 KG/M2 | WEIGHT: 213 LBS | OXYGEN SATURATION: 97 % | TEMPERATURE: 98 F | RESPIRATION RATE: 16 BRPM | SYSTOLIC BLOOD PRESSURE: 130 MMHG | DIASTOLIC BLOOD PRESSURE: 80 MMHG | HEIGHT: 69 IN

## 2018-01-20 LAB
BASOPHILS # BLD AUTO: 0 K/UL
BASOPHILS NFR BLD: 0.1 %
DIFFERENTIAL METHOD: ABNORMAL
EOSINOPHIL # BLD AUTO: 0.1 K/UL
EOSINOPHIL NFR BLD: 0.4 %
ERYTHROCYTE [DISTWIDTH] IN BLOOD BY AUTOMATED COUNT: 13.1 %
HCT VFR BLD AUTO: 36.8 %
HGB BLD-MCNC: 12.2 G/DL
LYMPHOCYTES # BLD AUTO: 1.3 K/UL
LYMPHOCYTES NFR BLD: 11 %
MCH RBC QN AUTO: 31.5 PG
MCHC RBC AUTO-ENTMCNC: 33.3 G/DL
MCV RBC AUTO: 95 FL
MONOCYTES # BLD AUTO: 0.7 K/UL
MONOCYTES NFR BLD: 6 %
NEUTROPHILS # BLD AUTO: 9.8 K/UL
NEUTROPHILS NFR BLD: 82.5 %
PLATELET # BLD AUTO: 343 K/UL
PMV BLD AUTO: 10.3 FL
RBC # BLD AUTO: 3.88 M/UL
WBC # BLD AUTO: 11.9 K/UL

## 2018-01-20 PROCEDURE — 36415 COLL VENOUS BLD VENIPUNCTURE: CPT

## 2018-01-20 PROCEDURE — 85025 COMPLETE CBC W/AUTO DIFF WBC: CPT

## 2018-01-20 PROCEDURE — 63600175 PHARM REV CODE 636 W HCPCS: Performed by: NURSE PRACTITIONER

## 2018-01-20 PROCEDURE — G0378 HOSPITAL OBSERVATION PER HR: HCPCS

## 2018-01-20 PROCEDURE — 25000003 PHARM REV CODE 250: Performed by: NURSE PRACTITIONER

## 2018-01-20 RX ORDER — TOBRAMYCIN AND DEXAMETHASONE 3; 1 MG/ML; MG/ML
1 SUSPENSION/ DROPS OPHTHALMIC EVERY 6 HOURS
Qty: 1 BOTTLE | Refills: 0 | Status: SHIPPED | OUTPATIENT
Start: 2018-01-20 | End: 2018-01-31

## 2018-01-20 RX ADMIN — SODIUM CHLORIDE: 0.9 INJECTION, SOLUTION INTRAVENOUS at 10:01

## 2018-01-20 RX ADMIN — KETOROLAC TROMETHAMINE 30 MG: 30 INJECTION, SOLUTION INTRAMUSCULAR at 07:01

## 2018-01-20 RX ADMIN — NAPHAZOLINE HYDROCHLORIDE AND PHENIRAMINE MALEATE 1 DROP: .25; 3 SOLUTION/ DROPS OPHTHALMIC at 12:01

## 2018-01-20 RX ADMIN — NAPHAZOLINE HYDROCHLORIDE AND PHENIRAMINE MALEATE 1 DROP: .25; 3 SOLUTION/ DROPS OPHTHALMIC at 05:01

## 2018-01-20 NOTE — HOSPITAL COURSE
Admitted with kidney stone which was extracted by Dr Barber and sent to lab. And recommends fu in 2-3 weeks. No infection in culture  Had subconjuntival hemorrhage bilat post op -advised watch for sx/sx iinfection --will resolve on own. Reassurance.   Alert nad; lungs clear cor rrr back neg cva tenderness     Per urology Dr Barber:  Patient underwent a stone extraction. The childress catheter can be removed tomoroww morning as per orders and she can go home in AM. I like to follow her as OP in 2 to 3 weeks. Thanks

## 2018-01-20 NOTE — NURSING
Pt complaining of dry, itchy eyes. Upon assessment bottom of sclera appeared to have busted blood vessels. Message sent to MALLORY Nolasco Np. Eye drop orders in place and to be carried out.

## 2018-01-20 NOTE — PLAN OF CARE
Problem: Patient Care Overview  Goal: Plan of Care Review  Outcome: Ongoing (interventions implemented as appropriate)  PT AAO X4. PT able to verbalize needs/want. Plan of care reviewed with patient at the beginning of the shift. Patient verbalized understanding. IV fluids infusing as ordered. Eye drops given this shift as ordered. Patient voided without difficulty after childress removal. Patient ambulates independently. Patient has remained free from fall/injury. Side rails up X2, bed in lowest, locked position, needs attended to, call light within reach will continue to monitor.

## 2018-01-20 NOTE — ANESTHESIA POSTPROCEDURE EVALUATION
"Anesthesia Post Evaluation    Patient: Tona Cha    Procedure(s) Performed: Procedure(s) (LRB):  EXTRACTION-STONE-URETEROSCOPY (Right)  PYELOGRAM-RETROGRADE (Right)    Final Anesthesia Type: general  Patient location during evaluation: PACU  Patient participation: Yes- Able to Participate  Level of consciousness: awake and alert  Post-procedure vital signs: reviewed and stable  Pain management: adequate  Airway patency: patent  PONV status at discharge: No PONV  Anesthetic complications: no      Cardiovascular status: hemodynamically stable and blood pressure returned to baseline  Respiratory status: unassisted, spontaneous ventilation and room air  Hydration status: euvolemic  Follow-up not needed.        Visit Vitals  /84   Pulse 72   Temp 36.4 °C (97.6 °F) (Oral)   Resp (!) 22   Ht 5' 9" (1.753 m)   Wt 96.6 kg (213 lb)   LMP 01/17/2018   SpO2 100%   Breastfeeding? No   BMI 31.45 kg/m²       Pain/Alexey Score: Pain Assessment Performed: Yes (1/19/2018  8:47 PM)  Presence of Pain: denies (1/19/2018  8:47 PM)  Pain Rating Prior to Med Admin: 10 (1/20/2018  7:16 AM)  Pain Rating Post Med Admin: 2 (1/19/2018  3:52 PM)  Alexey Score: 10 (1/19/2018  4:15 PM)      "

## 2018-01-20 NOTE — PLAN OF CARE
Problem: Patient Care Overview  Goal: Plan of Care Review  Outcome: Ongoing (interventions implemented as appropriate)  AAOx4. Up ad zoë. Escamilla in place draining red urine. To be d/c in am. No complaints of pain. IVF infusing per order. Pt slept well through the night. Q2 hour rounding utilized. Pain and comfort assessed. Bed low, brakes locked, SR up, call light within reach. POC reviewed. Pt verbalized understanding. Will continue to monitor.

## 2018-01-20 NOTE — NURSING
Discharge instructions reviewed with patient. Patient verbalized understanding. Peripheral IV removed intact. Belongings packed per patient.  Patient escorted off floor via wheel chair by float nurse Megan. Relinquish care at this time.

## 2018-01-20 NOTE — OP NOTE
DATE OF PROCEDURE:  01/19/2018.    PREOPERATIVE DIAGNOSIS:  Right ureteral stone.    POSTOPERATIVE DIAGNOSIS:  Right ureteral stone.    OPERATION PERFORMED:  Cystoscopy, right retrograde pyelogram.  Ureteroscopy,   stone extraction.    SURGEON:  Toño Barber M.D.    BLOOD LOSS:  Minimal.    FINDINGS:  A 5 mm right ureteral stone that was removed.  The stone was located   in the upper part of the lower right ureter.    PROCEDURE IN DETAILS:  With the patient under satisfactory general anesthesia   and placed in lithotomy position, the genitalia were prepped and draped in the   usual manner.  The urethra was treated with lidocaine gel 2%.  A #22 Bulgarian   Olympus cystoscope was placed through urethra into the bladder.  Inspection of   the urethra shows a length of 2.5 cm with no diverticula or lesions.  The   bladder shows a grade I trabeculation with no lesions or stones.  The trigone is   in normal shape, size and position and both ureteral orifices are within the   trigone.    A cone tip catheter was placed through the cystoscope into the bladder and into   the right ureter and 10 mL of contrast material were injected slowly under   fluoroscopy.  The retrograde pyelogram shows the presence of the stone in the   upper part of the lower right ureter.  This stone is producing a mild grade of   obstruction.  The cone tip catheter was removed and a wire guide was placed   through the cystoscope into the bladder and into the right ureter and all the   way up into the upper collecting system.  Over the wire guide, a balloon dilator   was placed.  The balloon dilator was 5 mm in diameter and 4 cm in length.  The   balloon was initially inflated to 14 atmospheres and then to 18 atmospheres and   with this pressure the intramural ureter was completely dilated.  At this point,   the balloon was deflated and removed leaving the wire guide in position.    At this point, the cystoscope was removed and replaced by semirigid    ureteroscope.  The ureteroscope was placed slowly in the ureter all the way up   to the level of the stone.  The stone was engaged with a 4 wire helical basket   and removed from the patient's ureter.  The stone was sent for pathological   diagnosis and analysis.    The ureteroscope was replaced into the right ureter and the ureteroscopy was   carried out up to the level of the ureteropelvic junction.  No further stones or   lesions were seen.  The ureteroscope was removed.    Over the previously placed wire guide, an open-ended ureteral catheter was   placed all the way up into the upper collecting system and this was left in   position for temporary drainage.  A Escamilla catheter was placed to support the   open-ended ureteral catheter.  The patient was extubated and transferred to   Recovery Room in satisfactory condition.    PLAN FOR THE PATIENT:  Escamilla catheter can be removed tomorrow and the patient   can go home in the morning if stable.      EOR/EMEKA  dd: 01/19/2018 15:56:20 (CST)  td: 01/19/2018 19:56:55 (CST)  Doc ID   #[id number]  Job ID #[id number]    CC:

## 2018-01-21 ENCOUNTER — HOSPITAL ENCOUNTER (EMERGENCY)
Facility: HOSPITAL | Age: 33
Discharge: HOME | End: 2018-01-21
Attending: EMERGENCY MEDICINE
Payer: COMMERCIAL

## 2018-01-21 VITALS
SYSTOLIC BLOOD PRESSURE: 117 MMHG | HEART RATE: 65 BPM | BODY MASS INDEX: 31.45 KG/M2 | RESPIRATION RATE: 17 BRPM | WEIGHT: 213 LBS | OXYGEN SATURATION: 98 % | DIASTOLIC BLOOD PRESSURE: 68 MMHG | TEMPERATURE: 98 F

## 2018-01-21 DIAGNOSIS — R10.9 RIGHT FLANK PAIN: Primary | ICD-10-CM

## 2018-01-21 DIAGNOSIS — N20.0 RENAL STONE: ICD-10-CM

## 2018-01-21 LAB
ALBUMIN SERPL BCP-MCNC: 3.3 G/DL
ALP SERPL-CCNC: 61 U/L
ALT SERPL W/O P-5'-P-CCNC: 41 U/L
ANION GAP SERPL CALC-SCNC: 8 MMOL/L
AST SERPL-CCNC: 41 U/L
B-HCG UR QL: NEGATIVE
BACTERIA #/AREA URNS HPF: ABNORMAL /HPF
BASOPHILS # BLD AUTO: 0.1 K/UL
BASOPHILS NFR BLD: 1.3 %
BILIRUB SERPL-MCNC: 0.7 MG/DL
BILIRUB UR QL STRIP: NEGATIVE
BUN SERPL-MCNC: 15 MG/DL
CALCIUM SERPL-MCNC: 8.5 MG/DL
CHLORIDE SERPL-SCNC: 112 MMOL/L
CLARITY UR: ABNORMAL
CO2 SERPL-SCNC: 21 MMOL/L
COLOR UR: YELLOW
CREAT SERPL-MCNC: 1.2 MG/DL
CTP QC/QA: YES
DIFFERENTIAL METHOD: ABNORMAL
EOSINOPHIL # BLD AUTO: 0.3 K/UL
EOSINOPHIL NFR BLD: 3 %
ERYTHROCYTE [DISTWIDTH] IN BLOOD BY AUTOMATED COUNT: 13.3 %
EST. GFR  (AFRICAN AMERICAN): >60 ML/MIN/1.73 M^2
EST. GFR  (NON AFRICAN AMERICAN): 60 ML/MIN/1.73 M^2
GLUCOSE SERPL-MCNC: 86 MG/DL
GLUCOSE UR QL STRIP: NEGATIVE
HCT VFR BLD AUTO: 33.7 %
HGB BLD-MCNC: 11.3 G/DL
HGB UR QL STRIP: ABNORMAL
KETONES UR QL STRIP: ABNORMAL
LEUKOCYTE ESTERASE UR QL STRIP: ABNORMAL
LYMPHOCYTES # BLD AUTO: 1.2 K/UL
LYMPHOCYTES NFR BLD: 10.7 %
MCH RBC QN AUTO: 31.1 PG
MCHC RBC AUTO-ENTMCNC: 33.4 G/DL
MCV RBC AUTO: 93 FL
MICROSCOPIC COMMENT: ABNORMAL
MONOCYTES # BLD AUTO: 0.7 K/UL
MONOCYTES NFR BLD: 6.6 %
NEUTROPHILS # BLD AUTO: 8.7 K/UL
NEUTROPHILS NFR BLD: 78.4 %
NITRITE UR QL STRIP: NEGATIVE
PH UR STRIP: 6 [PH] (ref 5–8)
PLATELET # BLD AUTO: 294 K/UL
PMV BLD AUTO: 9.7 FL
POTASSIUM SERPL-SCNC: 3.8 MMOL/L
PROT SERPL-MCNC: 6.4 G/DL
PROT UR QL STRIP: ABNORMAL
RBC # BLD AUTO: 3.63 M/UL
RBC #/AREA URNS HPF: 15 /HPF (ref 0–4)
SODIUM SERPL-SCNC: 141 MMOL/L
SP GR UR STRIP: >=1.03 (ref 1–1.03)
SQUAMOUS #/AREA URNS HPF: 11 /HPF
URN SPEC COLLECT METH UR: ABNORMAL
UROBILINOGEN UR STRIP-ACNC: NEGATIVE EU/DL
WBC # BLD AUTO: 11.1 K/UL
WBC #/AREA URNS HPF: 7 /HPF (ref 0–5)

## 2018-01-21 PROCEDURE — 96374 THER/PROPH/DIAG INJ IV PUSH: CPT

## 2018-01-21 PROCEDURE — 99284 EMERGENCY DEPT VISIT MOD MDM: CPT | Mod: 25

## 2018-01-21 PROCEDURE — 81025 URINE PREGNANCY TEST: CPT | Performed by: EMERGENCY MEDICINE

## 2018-01-21 PROCEDURE — 96376 TX/PRO/DX INJ SAME DRUG ADON: CPT

## 2018-01-21 PROCEDURE — 63600175 PHARM REV CODE 636 W HCPCS: Performed by: EMERGENCY MEDICINE

## 2018-01-21 PROCEDURE — 80053 COMPREHEN METABOLIC PANEL: CPT

## 2018-01-21 PROCEDURE — 81000 URINALYSIS NONAUTO W/SCOPE: CPT

## 2018-01-21 PROCEDURE — 85025 COMPLETE CBC W/AUTO DIFF WBC: CPT

## 2018-01-21 PROCEDURE — 96375 TX/PRO/DX INJ NEW DRUG ADDON: CPT

## 2018-01-21 PROCEDURE — 36415 COLL VENOUS BLD VENIPUNCTURE: CPT

## 2018-01-21 RX ORDER — CEPHALEXIN 500 MG/1
500 CAPSULE ORAL 4 TIMES DAILY
Qty: 20 CAPSULE | Refills: 0 | Status: SHIPPED | OUTPATIENT
Start: 2018-01-21 | End: 2018-01-26

## 2018-01-21 RX ORDER — KETOROLAC TROMETHAMINE 30 MG/ML
10 INJECTION, SOLUTION INTRAMUSCULAR; INTRAVENOUS
Status: COMPLETED | OUTPATIENT
Start: 2018-01-21 | End: 2018-01-21

## 2018-01-21 RX ORDER — KETOROLAC TROMETHAMINE 30 MG/ML
15 INJECTION, SOLUTION INTRAMUSCULAR; INTRAVENOUS
Status: COMPLETED | OUTPATIENT
Start: 2018-01-21 | End: 2018-01-21

## 2018-01-21 RX ORDER — ONDANSETRON 2 MG/ML
8 INJECTION INTRAMUSCULAR; INTRAVENOUS
Status: COMPLETED | OUTPATIENT
Start: 2018-01-21 | End: 2018-01-21

## 2018-01-21 RX ORDER — KETOROLAC TROMETHAMINE 10 MG/1
10 TABLET, FILM COATED ORAL EVERY 6 HOURS PRN
Qty: 15 TABLET | Refills: 0 | Status: SHIPPED | OUTPATIENT
Start: 2018-01-21 | End: 2018-01-31

## 2018-01-21 RX ORDER — HYDROMORPHONE HYDROCHLORIDE 2 MG/ML
1 INJECTION, SOLUTION INTRAMUSCULAR; INTRAVENOUS; SUBCUTANEOUS
Status: DISCONTINUED | OUTPATIENT
Start: 2018-01-21 | End: 2018-01-21

## 2018-01-21 RX ADMIN — KETOROLAC TROMETHAMINE 15 MG: 30 INJECTION, SOLUTION INTRAMUSCULAR at 10:01

## 2018-01-21 RX ADMIN — ONDANSETRON 8 MG: 2 INJECTION INTRAMUSCULAR; INTRAVENOUS at 09:01

## 2018-01-21 RX ADMIN — KETOROLAC TROMETHAMINE 10 MG: 30 INJECTION, SOLUTION INTRAMUSCULAR at 09:01

## 2018-01-21 NOTE — DISCHARGE INSTRUCTIONS
You probably have post op ureteral spasms. Take abx, call dr joshi tomorrow for appt this week. Return to ER for fever or uncontrolled pain

## 2018-01-21 NOTE — ED PROVIDER NOTES
Encounter Date: 1/21/2018    SCRIBE #1 NOTE: IKeira, mindi scribing for, and in the presence of,  .       History     Chief Complaint   Patient presents with    Flank Pain     right. pt reports she thinks she still may have kidney stones. was seen here recently for same       01/21/2018 9:36 AM     Chief complaint: Flank pain      Tona Cha is a 32 y.o. female who presents to the ED with right sided flank pain onset last night. Patient was seen in the ED 01/18 dx with an obstructive renal calculus in the right ureter and a UTI. She was subsequently admitted and underwent surgery to removed the stone on 01/19 without complications and discharged yesterday. She states the pain feels exactly the same as before the surgery and denies relief with ibuprofen. Patient denies SOB, chest pain, radiation of pain, or diarrhea.      The history is provided by the patient. No  was used.     Review of patient's allergies indicates:  No Known Allergies  Past Medical History:   Diagnosis Date    Bell palsy 12/2015    Renal disorder     kidney stones     History reviewed. No pertinent surgical history.  History reviewed. No pertinent family history.  Social History   Substance Use Topics    Smoking status: Current Every Day Smoker     Packs/day: 0.50    Smokeless tobacco: Not on file    Alcohol use Yes      Comment: occasional     Review of Systems   Constitutional: Negative for fever.   Respiratory: Negative for shortness of breath.    Cardiovascular: Negative for chest pain.   Gastrointestinal: Positive for abdominal pain (flank pain ). Negative for nausea.   Genitourinary: Positive for flank pain.   Musculoskeletal: Negative for back pain.   Skin: Negative for rash.       Physical Exam     Initial Vitals [01/21/18 0916]   BP Pulse Resp Temp SpO2   127/84 62 17 97.7 °F (36.5 °C) 98 %      MAP       98.33         Physical Exam    Nursing note and vitals  reviewed.  Constitutional: She appears well-developed and well-nourished.   HENT:   Head: Normocephalic and atraumatic.   Eyes: EOM are normal.   Neck: Normal range of motion. Neck supple.   Cardiovascular: Normal rate, regular rhythm and normal heart sounds. Exam reveals no gallop and no friction rub.    No murmur heard.  Pulmonary/Chest: Breath sounds normal. No respiratory distress. She has no wheezes. She has no rhonchi. She has no rales.   Musculoskeletal: Normal range of motion.   Neurological: She is alert and oriented to person, place, and time.   Skin: Skin is warm and dry.   Psychiatric: She has a normal mood and affect. Her behavior is normal. Judgment and thought content normal.         ED Course   Procedures  Labs Reviewed   URINALYSIS   CBC W/ AUTO DIFFERENTIAL   COMPREHENSIVE METABOLIC PANEL   POCT URINE PREGNANCY             Medical Decision Making:   History:   Old Medical Records: I decided to obtain old medical records.  Old Records Summarized: records from previous admission(s).  Clinical Tests:   Lab Tests: Ordered and Reviewed  Radiological Study: Ordered and Reviewed            Scribe Attestation:   Scribe #1: I performed the above scribed service and the documentation accurately describes the services I performed. I attest to the accuracy of the note.    I, Dr. Montenegro, personally performed the services described in this documentation. All medical record entries made by the scribe were at my direction and in my presence.  I have reviewed the chart and agree that the record reflects my personal performance and is accurate and complete.1:46 PM 01/21/2018            ED Course as of Jan 21 1346   Sun Jan 21, 2018   1053 Pain is returning, patient likely has postprocedural discomfort from the Stone removal.  X-rays unremarkable.  Patient was not discharged home with any medications according to her except for ibuprofen.  [EF]   1150 Case discussed with Dr. Sanchez of urology who recommends  starting the patient on Toradol and Keflex and having her call the office tomorrow.  [EF]      ED Course User Index  [EF] Toby Montenegro MD     Clinical Impression:   The encounter diagnosis was Renal stone.              32-year-old presents to the ER with continued flank pain after a recent stone extraction.  Work appears unremarkable.  Case discussed with urology who feels the pain is likely secondary to spasm or discomfort from the recent procedure.  Pain completely relieved on discharge.             Toby Montenegro MD  01/21/18 8621

## 2018-01-21 NOTE — PLAN OF CARE
01/21/18 1139   Final Note   Assessment Type Final Discharge Note   Discharge Disposition Home

## 2018-01-22 ENCOUNTER — TELEPHONE (OUTPATIENT)
Dept: UROLOGY | Facility: CLINIC | Age: 33
End: 2018-01-22

## 2018-01-22 NOTE — TELEPHONE ENCOUNTER
----- Message from Elver Lancaster sent at 1/22/2018  9:45 AM CST -----  Contact: self  Patient want to speak with a nurse regarding scheduling a hospital follow up appointment please call back at [phone]

## 2018-01-25 LAB
ANNOTATION COMMENT IMP: NORMAL
COMPN STONE: NORMAL
SPECIMEN SOURCE: NORMAL
STONE ANALYSIS IR-IMP: NORMAL

## 2018-01-29 NOTE — DISCHARGE SUMMARY
[facility]  Discharge Summary      Patient Name: Tona Cha  MRN: [id number]  Admission Date: 1/18/2018  Hospital Length of Stay: 2 days  Discharge Date and Time: 1/20/2018  2:15 PM  Attending Physician: No att. providers found   Discharging Provider: Sania Henderson MD  Primary Care Provider: Primary Doctor Estela      HPI:   Ms. Cha is a 33yo F with a PMH of kidney stones. She presents to the ED today with c/o right sided flank pain. Associated symptoms include N&V. Symptoms started about 45 minutes before coming to the hospital. While in the ED, an abdominal CT was done and showed a right ureteral stone and her UA was (+) UTI. IV Rocephin was initiated. Dr. Barber was already in the ED and evaluated her. She currently c/o right flank pain and nausea. The dilaudid did not help, but the toradol did.    Procedure(s) (LRB):  EXTRACTION-STONE-URETEROSCOPY (Right)  PYELOGRAM-RETROGRADE (Right)      Hospital Course:   Admitted with kidney stone which was extracted by Dr Barber and sent to lab. And recommends fu in 2-3 weeks. No infection in culture  Had subconjuntival hemorrhage bilat post op -advised watch for sx/sx iinfection --will resolve on own. Reassurance.   Alert nad; lungs clear cor rrr back neg cva tenderness      Consults:     No new Assessment & Plan notes have been filed under this hospital service since the last note was generated.  Service: Hospital Medicine    Final Active Diagnoses:    Diagnosis Date Noted POA    PRINCIPAL PROBLEM:  Acute unilateral obstructive uropathy with Hydronephrosis [N13.9] 01/18/2018 Yes    UTI (urinary tract infection) [N39.0] 01/18/2018 Yes      Problems Resolved During this Admission:    Diagnosis Date Noted Date Resolved POA       Discharged Condition: good    Disposition: Home or Self Care    Follow Up:  Follow-up Information     Toño Barber MD In 1 week.    Specialty:  Urology  Contact information:  [address] [place] [address]  [address]  [facility] [address]  [phone]                 Patient Instructions:     Activity as tolerated         Significant Diagnostic Studies:   Results for JOSIAH KC (MRN [id number]) as of 1/29/2018 11:56   Ref. Range 1/20/2018 05:44   WBC Latest Ref Range: 3.90 - 12.70 K/uL 11.90   RBC Latest Ref Range: 4.00 - 5.40 M/uL 3.88 (L)   Hemoglobin Latest Ref Range: 12.0 - 16.0 g/dL 12.2   Hematocrit Latest Ref Range: 37.0 - 48.5 % 36.8 (L)   MCV Latest Ref Range: 82 - 98 fL 95   MCH Latest Ref Range: 27.0 - 31.0 pg 31.5 (H)   MCHC Latest Ref Range: 32.0 - 36.0 g/dL 33.3   RDW Latest Ref Range: 11.5 - 14.5 % 13.1   Platelets Latest Ref Range: 150 - 350 K/uL 343     Results for JOSIAH KC (MRN [id number]) as of 1/29/2018 11:56   Ref. Range 1/19/2018 06:35   Sodium Latest Ref Range: 136 - 145 mmol/L 139   Potassium Latest Ref Range: 3.5 - 5.1 mmol/L 4.0   Chloride Latest Ref Range: 95 - 110 mmol/L 111 (H)   CO2 Latest Ref Range: 23 - 29 mmol/L 22 (L)   Anion Gap Latest Ref Range: 8 - 16 mmol/L 6 (L)   BUN, Bld Latest Ref Range: 6 - 20 mg/dL 12   Creatinine Latest Ref Range: 0.5 - 1.4 mg/dL 0.9   eGFR if non African American Latest Ref Range: >60 mL/min/1.73 m^2 >60   eGFR if  Latest Ref Range: >60 mL/min/1.73 m^2 >60   Glucose Latest Ref Range: 70 - 110 mg/dL 95   Calcium Latest Ref Range: 8.7 - 10.5 mg/dL 8.1 (L)       CT-  Obstructive right distal ureterolithiasis resulting in mild hydronephrosis.  Pending Diagnostic Studies:     None         Medications:  Reconciled Home Medications:   Discharge Medication List as of 1/20/2018  1:45 PM      START taking these medications    Details   tobramycin-dexamethasone 0.3-0.1% (TOBRADEX) 0.3-0.1 % DrpS Place 1 drop into both eyes every 6 (six) hours., Starting Sat 1/20/2018, Normal         CONTINUE these medications which have NOT CHANGED    Details   ibuprofen (ADVIL,MOTRIN) 600 MG tablet Take 1 tablet (600 mg total) by mouth every 6 (six)  hours as needed for Pain., Starting 10/11/2016, Until Discontinued, Print             Indwelling Lines/Drains at time of discharge:   Lines/Drains/Airways          No matching active lines, drains, or airways          Time spent on the discharge of patient: 25 minutes  Patient was seen and examined on the date of discharge and determined to be suitable for discharge.         Sania Henderson MD  Department of Hospital Medicine  [facility]

## 2018-01-31 ENCOUNTER — OFFICE VISIT (OUTPATIENT)
Dept: UROLOGY | Facility: CLINIC | Age: 33
End: 2018-01-31
Payer: COMMERCIAL

## 2018-01-31 VITALS
HEART RATE: 97 BPM | SYSTOLIC BLOOD PRESSURE: 123 MMHG | RESPIRATION RATE: 18 BRPM | HEIGHT: 69 IN | DIASTOLIC BLOOD PRESSURE: 76 MMHG | WEIGHT: 212.94 LBS | BODY MASS INDEX: 31.54 KG/M2

## 2018-01-31 DIAGNOSIS — N20.1 RIGHT URETERAL STONE: Primary | ICD-10-CM

## 2018-01-31 LAB
BILIRUB SERPL-MCNC: NEGATIVE MG/DL
BLOOD URINE, POC: ABNORMAL
COLOR, POC UA: ABNORMAL
GLUCOSE UR QL STRIP: NEGATIVE
KETONES UR QL STRIP: NEGATIVE
LEUKOCYTE ESTERASE URINE, POC: ABNORMAL
NITRITE, POC UA: NEGATIVE
PH, POC UA: 5
PROTEIN, POC: ABNORMAL
SPECIFIC GRAVITY, POC UA: 1.01
UROBILINOGEN, POC UA: NEGATIVE

## 2018-01-31 PROCEDURE — 99999 PR PBB SHADOW E&M-EST. PATIENT-LVL III: CPT | Mod: PBBFAC,,, | Performed by: UROLOGY

## 2018-01-31 PROCEDURE — 99212 OFFICE O/P EST SF 10 MIN: CPT | Mod: 25,S$GLB,, | Performed by: UROLOGY

## 2018-01-31 PROCEDURE — 81002 URINALYSIS NONAUTO W/O SCOPE: CPT | Mod: S$GLB,,, | Performed by: UROLOGY

## 2018-01-31 PROCEDURE — 3008F BODY MASS INDEX DOCD: CPT | Mod: S$GLB,,, | Performed by: UROLOGY

## 2018-01-31 NOTE — PROGRESS NOTES
OFFICE NOTE    CHIEF COMPLAINT:  Right ureteral stone.    Ms. Cha underwent a stone extraction on 01/19/2018.  Since then, her   symptoms have completely resolved and is doing much better.  The patient   underwent the ureteroscopy and stone extraction and she is feeling much better   after that.  The patient refers that after the procedure, she got some   hemorrhage in her eyes.  I talked to Dr. Lowery, the anesthesiologist that took   care of her during the procedure to see if it is an allergic reaction or   anything that she can do to prevent further complications in the future.    I also suggested to the patient to proceed with a 24-hour urine chemistry   analysis for her stone prevention.  She will proceed with that at [facility] and is going to have a followup appointment with me in six weeks in   order to determine what will be the best way to do some stone prevention in the   future.  I spent approximately 30 minutes with Ms. Cha, all the time was   spent on counseling and she left the office in satisfactory condition.      EOR/HN  dd: 01/31/2018 13:02:49 (CST)  td: 02/01/2018 00:41:16 (CST)  Doc ID   #[id number]  Job ID #[id number]    CC:

## 2018-05-27 NOTE — ASSESSMENT & PLAN NOTE
Urine culture collected--final pend  IV rocephin was initiated;   Consult Dr. Barber  ureteral stone extraction done today -will IV pain management and antiemetics

## 2018-05-27 NOTE — SUBJECTIVE & OBJECTIVE
Interval History:   Stone extraction done in afternoon    Review of Systems   Constitutional: Negative for chills and fever.   Eyes: Negative for visual disturbance.   Respiratory: Negative for cough and shortness of breath.    Cardiovascular: Negative for chest pain and palpitations.   Gastrointestinal: Positive for nausea. Negative for vomiting.   Genitourinary: Negative for dysuria, flank pain and hematuria.        Objective:     Vital Signs (Most Recent):  Temp: 98.2 °F (36.8 °C) (01/20/18 1207)  Pulse: 81 (01/20/18 1207)  Resp: 16 (01/20/18 1207)  BP: 130/80 (01/20/18 1207)  SpO2: 97 % (01/20/18 1207) Vital Signs (24h Range):        Weight: 96.6 kg (213 lb)  Body mass index is 31.45 kg/m².  No intake or output data in the 24 hours ending 05/27/18 1650   Physical Exam   Constitutional: She is oriented to person, place, and time. She appears distressed.   Moderately distressed due to pain   HENT:   Head: Normocephalic.   Eyes: Pupils are equal, round, and reactive to light.   Neck: Normal range of motion. Neck supple. No JVD present. No tracheal deviation present.   Cardiovascular: Normal rate, regular rhythm, normal heart sounds and intact distal pulses.    Pulmonary/Chest: Effort normal and breath sounds normal. No respiratory distress.   Abdominal: Soft. Bowel sounds are normal. She exhibits no distension. There is no tenderness. There is CVA tenderness.   Mild Right CVA tenderness   Neurological: She is alert and oriented to person, place, and time. No cranial nerve deficit.   Skin: Skin is warm and dry. Capillary refill takes less than 2 seconds.   Psychiatric: She has a normal mood and affect. Her behavior is normal. Judgment and thought content normal.       Significant Labs: All pertinent labs within the past 24 hours have been reviewed.    Significant Imaging: I have reviewed and interpreted all pertinent imaging results/findings within the past 24 hours.

## 2018-05-27 NOTE — PROGRESS NOTES
[facility] Hospital Medicine  Progress Note    Patient Name: Tona Cha  MRN: [id number]  Patient Class: OP- Observation   Admission Date: 1/18/2018  Length of Stay: 0 days  Attending Physician: No att. providers found  Primary Care Provider: Primary Doctor No        Subjective:     Principal Problem:Acute unilateral obstructive uropathy    HPI:  Ms. Cha is a 33yo F with a PMH of kidney stones. She presents to the ED today with c/o right sided flank pain. Associated symptoms include N&V. Symptoms started about 45 minutes before coming to the hospital. While in the ED, an abdominal CT was done and showed a right ureteral stone and her UA was (+) UTI. IV Rocephin was initiated. Dr. Barber was already in the ED and evaluated her. She currently c/o right flank pain and nausea. The dilaudid did not help, but the toradol did.    Hospital Course:  Admitted with kidney stone which was extracted by Dr Barber and sent to lab. And recommends fu in 2-3 weeks. No infection in culture  Had subconjuntival hemorrhage bilat post op -advised watch for sx/sx iinfection --will resolve on own. Reassurance.   Alert nad; lungs clear cor rrr back neg cva tenderness     Per urology Dr Barber:  Patient underwent a stone extraction. The childress catheter can be removed tomoroww morning as per orders and she can go home in AM. I like to follow her as OP in 2 to 3 weeks. Thanks    Interval History:   Stone extraction done in afternoon    Review of Systems   Constitutional: Negative for chills and fever.   Eyes: Negative for visual disturbance.   Respiratory: Negative for cough and shortness of breath.    Cardiovascular: Negative for chest pain and palpitations.   Gastrointestinal: Positive for nausea. Negative for vomiting.   Genitourinary: Negative for dysuria, flank pain and hematuria.        Objective:     Vital Signs (Most Recent):  Temp: 98.2 °F (36.8 °C) (01/20/18 1207)  Pulse: 81 (01/20/18 1207)  Resp: 16  (01/20/18 1207)  BP: 130/80 (01/20/18 1207)  SpO2: 97 % (01/20/18 1207) Vital Signs (24h Range):        Weight: 96.6 kg (213 lb)  Body mass index is 31.45 kg/m².  No intake or output data in the 24 hours ending 05/27/18 1650   Physical Exam   Constitutional: She is oriented to person, place, and time. She appears distressed.   Moderately distressed due to pain   HENT:   Head: Normocephalic.   Eyes: Pupils are equal, round, and reactive to light.   Neck: Normal range of motion. Neck supple. No JVD present. No tracheal deviation present.   Cardiovascular: Normal rate, regular rhythm, normal heart sounds and intact distal pulses.    Pulmonary/Chest: Effort normal and breath sounds normal. No respiratory distress.   Abdominal: Soft. Bowel sounds are normal. She exhibits no distension. There is no tenderness. There is CVA tenderness.   Mild Right CVA tenderness   Neurological: She is alert and oriented to person, place, and time. No cranial nerve deficit.   Skin: Skin is warm and dry. Capillary refill takes less than 2 seconds.   Psychiatric: She has a normal mood and affect. Her behavior is normal. Judgment and thought content normal.       Significant Labs: All pertinent labs within the past 24 hours have been reviewed.    Significant Imaging: I have reviewed and interpreted all pertinent imaging results/findings within the past 24 hours.    Assessment/Plan:      * Acute unilateral obstructive uropathy with Hydronephrosis    Urine culture collected--final pend  IV rocephin was initiated;   Consult Dr. Barber  ureteral stone extraction done today -will IV pain management and antiemetics          UTI (urinary tract infection)    Due to stone -see above   IV Rocephin  Urine cultured in ED            VTE Risk Mitigation         Ordered     Low Risk of VTE  Once      01/18/18 2207              Sania Henderson MD  Department of Hospital Medicine   [facility]

## 2021-07-01 ENCOUNTER — PATIENT MESSAGE (OUTPATIENT)
Dept: ADMINISTRATIVE | Facility: OTHER | Age: 36
End: 2021-07-01

## 2022-04-28 ENCOUNTER — HOSPITAL ENCOUNTER (EMERGENCY)
Facility: HOSPITAL | Age: 37
Discharge: HOME | End: 2022-04-29
Attending: EMERGENCY MEDICINE
Payer: COMMERCIAL

## 2022-04-28 DIAGNOSIS — A05.9 FOOD POISONING: Primary | ICD-10-CM

## 2022-04-28 PROCEDURE — 96375 TX/PRO/DX INJ NEW DRUG ADDON: CPT

## 2022-04-28 PROCEDURE — 96365 THER/PROPH/DIAG IV INF INIT: CPT

## 2022-04-28 PROCEDURE — 63600175 PHARM REV CODE 636 W HCPCS: Performed by: EMERGENCY MEDICINE

## 2022-04-28 PROCEDURE — 25000003 PHARM REV CODE 250: Performed by: EMERGENCY MEDICINE

## 2022-04-28 PROCEDURE — 99284 EMERGENCY DEPT VISIT MOD MDM: CPT | Mod: 25

## 2022-04-28 RX ORDER — DIPHENOXYLATE HYDROCHLORIDE AND ATROPINE SULFATE 2.5; .025 MG/1; MG/1
2 TABLET ORAL
Status: COMPLETED | OUTPATIENT
Start: 2022-04-28 | End: 2022-04-29

## 2022-04-28 RX ORDER — ONDANSETRON 2 MG/ML
8 INJECTION INTRAMUSCULAR; INTRAVENOUS
Status: COMPLETED | OUTPATIENT
Start: 2022-04-28 | End: 2022-04-28

## 2022-04-28 RX ORDER — ONDANSETRON 8 MG/1
8 TABLET, ORALLY DISINTEGRATING ORAL EVERY 6 HOURS PRN
Qty: 15 TABLET | Refills: 0 | Status: SHIPPED | OUTPATIENT
Start: 2022-04-28 | End: 2024-02-20

## 2022-04-28 RX ADMIN — PROMETHAZINE HYDROCHLORIDE 12.5 MG: 25 INJECTION INTRAMUSCULAR; INTRAVENOUS at 11:04

## 2022-04-28 RX ADMIN — ONDANSETRON 8 MG: 2 INJECTION INTRAMUSCULAR; INTRAVENOUS at 11:04

## 2022-04-29 VITALS
OXYGEN SATURATION: 97 % | SYSTOLIC BLOOD PRESSURE: 118 MMHG | HEIGHT: 69 IN | TEMPERATURE: 97 F | DIASTOLIC BLOOD PRESSURE: 61 MMHG | HEART RATE: 97 BPM | BODY MASS INDEX: 32.58 KG/M2 | WEIGHT: 220 LBS | RESPIRATION RATE: 17 BRPM

## 2022-04-29 PROCEDURE — 25000003 PHARM REV CODE 250: Performed by: EMERGENCY MEDICINE

## 2022-04-29 PROCEDURE — 96361 HYDRATE IV INFUSION ADD-ON: CPT

## 2022-04-29 RX ADMIN — DIPHENOXYLATE HYDROCHLORIDE AND ATROPINE SULFATE 2 TABLET: 2.5; .025 TABLET ORAL at 12:04

## 2022-04-29 RX ADMIN — SODIUM CHLORIDE 1000 ML: 0.9 INJECTION, SOLUTION INTRAVENOUS at 12:04

## 2022-04-29 NOTE — ED PROVIDER NOTES
"Encounter Date: 4/28/2022    SCRIBE #1 NOTE: I, Mandy Palumbo, am scribing for, and in the presence of, Richard José MD.       History     Chief Complaint   Patient presents with    Abdominal Cramping     Pt c/o severe abd pain and cramping , pt states " I think it may be food poisoning" ; pt had pork nachos for lunch and started to have stomach issues ; pt also reports diarrhea       Time seen by provider: 11:12 PM on 04/28/2022    Tona Cha is a 37 y.o. female who presents to the ED with N/V/D and abdominal pain that began 9 hours ago. Pt had pork nachos for lunch. Pt describes abdominal pain as "cramping". Pt reports 6-7 episodes of vomiting. Pt reports 8 episodes of diarrhea. Pt did not take medication for nausea. The patient denies fever, congestion, cough, chest pain, blood in stool, blood in vomit, or any other symptoms at this time. PMHx of kidney stones and bell palsy. PSHx of ureteroscopy.     The history is provided by the patient and the spouse.     Review of patient's allergies indicates:  No Known Allergies  Past Medical History:   Diagnosis Date    Bell palsy 12/2015    Renal disorder     kidney stones     Past Surgical History:   Procedure Laterality Date    URETEROSCOPY       No family history on file.  Social History     Tobacco Use    Smoking status: Current Every Day Smoker     Packs/day: 0.50    Smokeless tobacco: Never Used   Substance Use Topics    Alcohol use: Yes     Comment: occasional    Drug use: No     Review of Systems   Constitutional: Negative for fever.   HENT: Negative for congestion.    Respiratory: Negative for cough.    Cardiovascular: Negative for chest pain.   Gastrointestinal: Positive for abdominal pain (cramping), diarrhea, nausea and vomiting. Negative for blood in stool.        - blood in vomit   Musculoskeletal: Negative for arthralgias.   Skin: Negative for pallor.   Hematological: Does not bruise/bleed easily.   Psychiatric/Behavioral: Negative for " agitation.       Physical Exam     Initial Vitals [04/28/22 2143]   BP Pulse Resp Temp SpO2   119/76 98 20 97.4 °F (36.3 °C) 98 %      MAP       --         Physical Exam    Nursing note and vitals reviewed.  Constitutional: She appears well-developed.  Non-toxic appearance.   HENT:   Head: Normocephalic and atraumatic.   Eyes: EOM are normal. Pupils are equal, round, and reactive to light.   Neck: Neck supple.   Cardiovascular: Normal rate, regular rhythm, normal heart sounds and intact distal pulses. Exam reveals no gallop and no friction rub.    No murmur heard.  Pulmonary/Chest: Breath sounds normal. No respiratory distress. She has no decreased breath sounds. She has no wheezes. She has no rhonchi. She has no rales.   Abdominal: Abdomen is soft. Bowel sounds are normal. She exhibits no distension. There is abdominal tenderness in the right lower quadrant and left lower quadrant.   Mild bilateral lower abdomen tenderness.    Musculoskeletal:         General: Normal range of motion.      Cervical back: Neck supple.     Neurological: She is alert and oriented to person, place, and time.   Skin: Skin is warm and dry.   Psychiatric: She has a normal mood and affect.         ED Course   Procedures  Labs Reviewed - No data to display       Imaging Results    None          Medications   sodium chloride 0.9% bolus 1,000 mL (has no administration in time range)   ondansetron injection 8 mg (has no administration in time range)   promethazine (PHENERGAN) 12.5 mg in dextrose 5 % 50 mL IVPB (has no administration in time range)   diphenoxylate-atropine 2.5-0.025 mg per tablet 2 tablet (has no administration in time range)     Medical Decision Making:   History:   Old Medical Records: I decided to obtain old medical records.  Patient appears to have food poisoning.  Abdomen soft and not significantly tender.  History is consistent with food poisoning and she feels better after interventions here in the emergency room.  I do  not think she has an acute abdomen.  Vital signs stable I feel that she is stable for discharge.          Scribe Attestation:   Scribe #1: I performed the above scribed service and the documentation accurately describes the services I performed. I attest to the accuracy of the note.              I, Dr. Richard José personally performed the services described in this documentation. All medical record entries made by the scribe were at my direction and in my presence.  I have reviewed the chart and agree that the record reflects my personal performance and is accurate and complete. Richard José MD.  11:42 PM 04/28/2022    DISCLAIMER: This note was prepared with Dragon NaturallySpeaking voice recognition transcription software. Garbled syntax, mangled pronouns, and other bizarre constructions may be attributed to that software system     Clinical Impression:   Final diagnoses:  [A05.9] Food poisoning (Primary)          ED Disposition Condition    Discharge Stable        ED Prescriptions     Medication Sig Dispense Start Date End Date Auth. Provider    ondansetron (ZOFRAN ODT) 8 MG TbDL Take 1 tablet (8 mg total) by mouth every 6 (six) hours as needed. 15 tablet 4/28/2022  Richard José MD        Follow-up Information     Follow up With Specialties Details Why Contact Info    [facility] Emergency Dept Emergency Medicine  If symptoms worsen [address] [place] [id number] [phone]           Richard José MD  04/28/22 0568

## 2023-01-12 ENCOUNTER — OFFICE VISIT (OUTPATIENT)
Dept: URGENT CARE | Facility: CLINIC | Age: 38
End: 2023-01-12
Payer: COMMERCIAL

## 2023-01-12 VITALS
OXYGEN SATURATION: 96 % | WEIGHT: 212 LBS | HEIGHT: 70 IN | DIASTOLIC BLOOD PRESSURE: 84 MMHG | TEMPERATURE: 98 F | RESPIRATION RATE: 16 BRPM | HEART RATE: 96 BPM | BODY MASS INDEX: 30.35 KG/M2 | SYSTOLIC BLOOD PRESSURE: 122 MMHG

## 2023-01-12 DIAGNOSIS — J01.90 ACUTE VIRAL SINUSITIS: Primary | ICD-10-CM

## 2023-01-12 DIAGNOSIS — B97.89 ACUTE VIRAL SINUSITIS: Primary | ICD-10-CM

## 2023-01-12 PROCEDURE — 96372 THER/PROPH/DIAG INJ SC/IM: CPT | Mod: S$GLB,,, | Performed by: STUDENT IN AN ORGANIZED HEALTH CARE EDUCATION/TRAINING PROGRAM

## 2023-01-12 PROCEDURE — 99203 PR OFFICE/OUTPT VISIT, NEW, LEVL III, 30-44 MIN: ICD-10-PCS | Mod: 25,S$GLB,, | Performed by: STUDENT IN AN ORGANIZED HEALTH CARE EDUCATION/TRAINING PROGRAM

## 2023-01-12 PROCEDURE — 99203 OFFICE O/P NEW LOW 30 MIN: CPT | Mod: 25,S$GLB,, | Performed by: STUDENT IN AN ORGANIZED HEALTH CARE EDUCATION/TRAINING PROGRAM

## 2023-01-12 PROCEDURE — 96372 PR INJECTION,THERAP/PROPH/DIAG2ST, IM OR SUBCUT: ICD-10-PCS | Mod: S$GLB,,, | Performed by: STUDENT IN AN ORGANIZED HEALTH CARE EDUCATION/TRAINING PROGRAM

## 2023-01-12 RX ORDER — FLUTICASONE PROPIONATE 50 MCG
1 SPRAY, SUSPENSION (ML) NASAL DAILY
Qty: 15.8 ML | Refills: 0 | Status: SHIPPED | OUTPATIENT
Start: 2023-01-12 | End: 2023-03-21

## 2023-01-12 RX ORDER — DEXTROMETHORPHAN HYDROBROMIDE, GUAIFENESIN, PHENYLEPHRINE HYDROCHLORIDE 17.5; 400; 1 MG/1; MG/1; MG/1
1 TABLET ORAL
Qty: 42 TABLET | Refills: 0 | Status: SHIPPED | OUTPATIENT
Start: 2023-01-12 | End: 2024-02-20

## 2023-01-12 RX ORDER — DEXAMETHASONE SODIUM PHOSPHATE 4 MG/ML
8 INJECTION, SOLUTION INTRA-ARTICULAR; INTRALESIONAL; INTRAMUSCULAR; INTRAVENOUS; SOFT TISSUE
Status: COMPLETED | OUTPATIENT
Start: 2023-01-12 | End: 2023-01-12

## 2023-01-12 RX ADMIN — DEXAMETHASONE SODIUM PHOSPHATE 8 MG: 4 INJECTION, SOLUTION INTRA-ARTICULAR; INTRALESIONAL; INTRAMUSCULAR; INTRAVENOUS; SOFT TISSUE at 09:01

## 2023-01-12 NOTE — PROGRESS NOTES
"Subjective:       Patient ID: Tona Cha is a 37 y.o. female.    Vitals:  height is 5' 10" (1.778 m) and weight is 96.2 kg (212 lb). Her oral temperature is 98.3 °F (36.8 °C). Her blood pressure is 122/84 and her pulse is 96. Her respiration is 16 and oxygen saturation is 96%.     Chief Complaint: Sinus Problem    Patient is a 37-year-old female with a past medical history of nephrolithiasis and Bell's palsy who presents to clinic for evaluation of sinus related issues.  Patient reports symptoms x2 weeks.  Patient reports she is vaccinated.  Patient denies any recent or known sick exposures.  Patient reports longstanding issues with sinuses and allergies.  Patient states leaving out of town with family tomorrow.  Patient states she has taken over-the-counter Sudafed and Benadryl for symptoms.  Patient reports this has provided some relief of symptoms.  Patient states she has experienced nasal sinus congestion with postnasal drainage, sinus pressure, and sinus related headaches.  Patient denies any acute dizziness, generalized body aches, fever or chills, ear pain or sore throat, chest pain or palpitations, shortness of breath or cough, abdominal pain, nausea or vomiting, diarrhea, urinary symptoms, rash, or change in mentation.    Sinus Problem  This is a new problem. The current episode started 1 to 4 weeks ago (2 weeks). The problem is unchanged. There has been no fever. Associated symptoms include congestion, headaches (Sinus related) and sinus pressure. Pertinent negatives include no chills, coughing, diaphoresis, ear pain, shortness of breath or sore throat.     Constitution: Negative. Negative for chills, sweating, fatigue and fever.   HENT:  Positive for congestion, postnasal drip and sinus pressure. Negative for ear pain and sore throat.    Neck: neck negative.   Cardiovascular: Negative.  Negative for chest pain and palpitations.   Eyes: Negative.    Respiratory: Negative.  Negative for chest " tightness, cough and shortness of breath.    Gastrointestinal: Negative.  Negative for abdominal pain, nausea, vomiting and diarrhea.   Endocrine: negative.   Genitourinary: Negative.  Negative for dysuria, frequency and urgency.   Musculoskeletal: Negative.  Negative for muscle ache.   Skin: Negative.  Negative for color change, pale, rash and erythema.   Allergic/Immunologic: Negative.    Neurological:  Positive for headaches (Sinus related). Negative for dizziness, light-headedness, passing out, disorientation and altered mental status.   Hematologic/Lymphatic: Negative.    Psychiatric/Behavioral: Negative.  Negative for altered mental status, disorientation and confusion.      Objective:      Physical Exam   Constitutional: She is oriented to person, place, and time. She appears well-developed. She is cooperative.  Non-toxic appearance. She does not appear ill. No distress.   HENT:   Head: Normocephalic and atraumatic.   Ears:   Right Ear: Hearing, tympanic membrane, external ear and ear canal normal.   Left Ear: Hearing, tympanic membrane, external ear and ear canal normal.   Nose: Congestion present. No mucosal edema, rhinorrhea or nasal deformity. No epistaxis. Right sinus exhibits no maxillary sinus tenderness and no frontal sinus tenderness. Left sinus exhibits no maxillary sinus tenderness and no frontal sinus tenderness.   Mouth/Throat: Uvula is midline, oropharynx is clear and moist and mucous membranes are normal. Mucous membranes are moist. No trismus in the jaw. Normal dentition. No uvula swelling. No oropharyngeal exudate or posterior oropharyngeal erythema. Oropharynx is clear.   Eyes: Conjunctivae and lids are normal. Pupils are equal, round, and reactive to light. Right eye exhibits no discharge. Left eye exhibits no discharge. No scleral icterus.   Neck: Trachea normal and phonation normal. Neck supple. No neck rigidity present.   Cardiovascular: Normal rate, regular rhythm, normal heart sounds  and normal pulses.   Pulmonary/Chest: Effort normal and breath sounds normal. No respiratory distress. She has no wheezes. She has no rhonchi. She has no rales.   Abdominal: Normal appearance and bowel sounds are normal. She exhibits no distension. Soft. There is no abdominal tenderness.   Musculoskeletal: Normal range of motion.         General: No deformity. Normal range of motion.      Cervical back: She exhibits no tenderness.   Lymphadenopathy:     She has no cervical adenopathy.   Neurological: She is alert and oriented to person, place, and time. She exhibits normal muscle tone. Coordination normal.   Skin: Skin is warm, dry, intact, not diaphoretic, not pale and no rash. Capillary refill takes less than 2 seconds. No erythema   Psychiatric: Her speech is normal and behavior is normal. Judgment and thought content normal.   Nursing note and vitals reviewed.      Assessment:       1. Acute viral sinusitis          Plan:         Acute viral sinusitis    Other orders  -     dexAMETHasone injection 8 mg  -     phenylephrine-DM-guaiFENesin (DECONEX DMX) 10-17.5-400 mg Tab; Take 1 tablet by mouth every 4 to 6 hours as needed (Congestion).  Dispense: 42 tablet; Refill: 0  -     fluticasone propionate (FLONASE) 50 mcg/actuation nasal spray; 1 spray (50 mcg total) by Each Nostril route once daily.  Dispense: 15.8 mL; Refill: 0                 Labs:  Patient refused point of care testing  Decadron 8 mg IM in clinic.  Patient tolerated well.  No complications noted.    Take medications as prescribed.    Tylenol/Motrin per package instructions for any pain or fever.    Assure adequate hydration.    Recommend nasal saline flushes or irrigation as directed.    Follow-up PCP in 1-2 days.    Return to clinic as needed.    To ED for any new or acutely worsening symptoms.    Patient in agreement with plan of care.    DISCLAIMER: Please note that my documentation in this Electronic Healthcare Record was produced using speech  recognition software and therefore may contain errors related to that software system.These could include grammar, punctuation and spelling errors or the inclusion/exclusion of phrases that were not intended. Garbled syntax, mangled pronouns, and other bizarre constructions may be attributed to that software system.

## 2024-01-29 ENCOUNTER — OFFICE VISIT (OUTPATIENT)
Dept: URGENT CARE | Facility: CLINIC | Age: 39
End: 2024-01-29
Payer: COMMERCIAL

## 2024-01-29 VITALS
WEIGHT: 195 LBS | RESPIRATION RATE: 22 BRPM | BODY MASS INDEX: 27.92 KG/M2 | HEART RATE: 71 BPM | DIASTOLIC BLOOD PRESSURE: 80 MMHG | TEMPERATURE: 98 F | HEIGHT: 70 IN | OXYGEN SATURATION: 99 % | SYSTOLIC BLOOD PRESSURE: 124 MMHG

## 2024-01-29 DIAGNOSIS — M54.30 SCIATIC NERVE PAIN, UNSPECIFIED LATERALITY: ICD-10-CM

## 2024-01-29 DIAGNOSIS — S39.012A BACK STRAIN, INITIAL ENCOUNTER: Primary | ICD-10-CM

## 2024-01-29 PROCEDURE — 96372 THER/PROPH/DIAG INJ SC/IM: CPT | Mod: S$GLB,,, | Performed by: STUDENT IN AN ORGANIZED HEALTH CARE EDUCATION/TRAINING PROGRAM

## 2024-01-29 PROCEDURE — 99214 OFFICE O/P EST MOD 30 MIN: CPT | Mod: 25,S$GLB,, | Performed by: STUDENT IN AN ORGANIZED HEALTH CARE EDUCATION/TRAINING PROGRAM

## 2024-01-29 RX ORDER — DEXAMETHASONE SODIUM PHOSPHATE 100 MG/10ML
10 INJECTION INTRAMUSCULAR; INTRAVENOUS ONCE
Status: COMPLETED | OUTPATIENT
Start: 2024-01-29 | End: 2024-01-29

## 2024-01-29 RX ORDER — DICLOFENAC SODIUM 1 MG/ML
1 SOLUTION/ DROPS OPHTHALMIC DAILY PRN
COMMUNITY

## 2024-01-29 RX ORDER — KETOROLAC TROMETHAMINE 30 MG/ML
60 INJECTION, SOLUTION INTRAMUSCULAR; INTRAVENOUS
Status: COMPLETED | OUTPATIENT
Start: 2024-01-29 | End: 2024-01-29

## 2024-01-29 RX ORDER — CELECOXIB 200 MG/1
200 CAPSULE ORAL 2 TIMES DAILY
Qty: 40 CAPSULE | Refills: 0 | Status: SHIPPED | OUTPATIENT
Start: 2024-01-29 | End: 2024-02-18

## 2024-01-29 RX ORDER — PREDNISONE 20 MG/1
20 TABLET ORAL DAILY
Qty: 5 TABLET | Refills: 0 | Status: SHIPPED | OUTPATIENT
Start: 2024-01-29 | End: 2024-02-03

## 2024-01-29 RX ADMIN — DEXAMETHASONE SODIUM PHOSPHATE 10 MG: 100 INJECTION INTRAMUSCULAR; INTRAVENOUS at 10:01

## 2024-01-29 RX ADMIN — KETOROLAC TROMETHAMINE 60 MG: 30 INJECTION, SOLUTION INTRAMUSCULAR; INTRAVENOUS at 10:01

## 2024-01-29 NOTE — PROGRESS NOTES
"Subjective:      Patient ID: Tona Cha is a 39 y.o. female.    Vitals:  height is 5' 10" (1.778 m) and weight is 88.5 kg (195 lb). Her temperature is 97.9 °F (36.6 °C). Her blood pressure is 124/80 and her pulse is 71. Her respiration is 22 (abnormal) and oxygen saturation is 99%.     Chief Complaint: Pain    Ambulatory to room with complaint of lower back pain.  States was getting out of the shower Saturday evening and believes she twisted wrong.  She complains of a sharp pain to the left lower back region with minimal radiation down the left buttocks.  Pain is exacerbated by lying down, minimally relieved with movement.  She has tried multiple stretching exercises without relief.    Pain  This is a new problem. The current episode started in the past 7 days (x's 2 days). The problem has been gradually worsening. She has tried heat and ice (diclofenac) for the symptoms. The treatment provided no relief.       Constitution: Negative.   HENT: Negative.     Neck: neck negative.   Cardiovascular: Negative.    Eyes: Negative.    Respiratory: Negative.     Gastrointestinal: Negative.    Genitourinary: Negative.    Musculoskeletal: Negative.  Positive for pain.   Skin: Negative.    Allergic/Immunologic: Negative.    Neurological: Negative.    Psychiatric/Behavioral: Negative.        Objective:     Physical Exam   Constitutional: She is oriented to person, place, and time. She appears well-developed. She is cooperative. No distress.   HENT:   Head: Normocephalic and atraumatic.   Nose: Nose normal.   Mouth/Throat: Oropharynx is clear and moist and mucous membranes are normal.   Eyes: Conjunctivae and lids are normal.   Neck: Trachea normal and phonation normal. Neck supple.   Cardiovascular: Normal rate, regular rhythm, normal heart sounds and normal pulses.   Pulmonary/Chest: Effort normal and breath sounds normal.   Abdominal: Normal appearance and bowel sounds are normal. She exhibits no mass. Soft. "   Musculoskeletal:         General: No deformity.      Comments: Negative straight leg raises.  Attempted stretching exercises for sciatic relief, minimal relief achieved.   Neurological: She is alert and oriented to person, place, and time. She has normal strength and normal reflexes. No sensory deficit.   Skin: Skin is warm, dry, intact and not diaphoretic.   Psychiatric: Her speech is normal and behavior is normal. Judgment and thought content normal.   Nursing note and vitals reviewed.      Assessment:     1. Back strain, initial encounter    2. Sciatic nerve pain, unspecified laterality        Plan:       Back strain, initial encounter    Sciatic nerve pain, unspecified laterality    Other orders  -     celecoxib (CELEBREX) 200 MG capsule; Take 1 capsule (200 mg total) by mouth 2 (two) times daily. for 20 days  Dispense: 40 capsule; Refill: 0  -     predniSONE (DELTASONE) 20 MG tablet; Take 1 tablet (20 mg total) by mouth once daily. for 5 days  Dispense: 5 tablet; Refill: 0  -     dexAMETHasone injection 10 mg  -     ketorolac injection 60 mg           Recommended follow-up, next course would be orthopedic referral.

## 2024-02-20 ENCOUNTER — HOSPITAL ENCOUNTER (OUTPATIENT)
Dept: PREADMISSION TESTING | Facility: HOSPITAL | Age: 39
Discharge: HOME | End: 2024-02-20
Attending: SPECIALIST
Payer: COMMERCIAL

## 2024-02-20 VITALS
BODY MASS INDEX: 28.35 KG/M2 | OXYGEN SATURATION: 99 % | HEART RATE: 59 BPM | WEIGHT: 198 LBS | SYSTOLIC BLOOD PRESSURE: 104 MMHG | TEMPERATURE: 98 F | DIASTOLIC BLOOD PRESSURE: 69 MMHG | HEIGHT: 70 IN | RESPIRATION RATE: 16 BRPM

## 2024-02-20 DIAGNOSIS — Z01.818 PREOP TESTING: Primary | ICD-10-CM

## 2024-02-20 RX ORDER — CEFAZOLIN SODIUM 2 G/50ML
2 SOLUTION INTRAVENOUS ONCE
Status: CANCELLED | OUTPATIENT
Start: 2024-02-29

## 2024-02-20 RX ORDER — DIPHENHYDRAMINE HCL 25 MG
25 CAPSULE ORAL EVERY 6 HOURS PRN
COMMUNITY

## 2024-02-20 NOTE — PRE ADMISSION SCREENING
Preadmit assessment complete. Review of patient health history and home medications. Questions answered. Patient/  voiced understanding. Chlorhexidine scrub given to patient for preop shower Preop education per AVS

## 2024-02-20 NOTE — DISCHARGE INSTRUCTIONS
To confirm, Your doctor has instructed you that surgery is scheduled for: Thursday 2/29/24    Pre-Op will call the afternoon prior to surgery between 4:00 and 6:00 PM with the final arrival time.  Wednesday     Please report to Registration at front of the hospital --it is best to park in the garage on [address]    Do not eat or drink anything after midnight the night before your surgery - THIS INCLUDES  WATER, GUM, MINTS AND CANDY.    YOU MAY BRUSH YOUR TEETH     TAKE APPROVED  MEDICATIONS WITH A SMALL SIP OF WATER THE MORNING OF YOUR PROCEDURE: See Medication list    PLEASE NOTE:  The surgery schedule has many variables which may affect the time of your surgery case.  Family members should be available if your surgery time changes.  Plan to be here the day of your procedure between 4-6 hours.    DO NOT TAKE THESE MEDICATIONS 5-7 DAYS PRIOR to your procedure or per your surgeon's request: ASPIRIN, ALEVE, ADVIL, IBUPROFEN,  DIEGO SELTZER, BC , FISH OIL , VITAMIN E, HERBALS  (May take Tylenol)                                                     IMPORTANT INSTRUCTIONS    Do not smoke, vape or drink alcoholic beverages 24 hours prior to your procedure.  Shower the night before AND the morning of your procedure with a Chlorhexidine wash such as Hibiclens or Dial antibacterial soap from the neck down.    Do not get it on your face or in your eyes.  You may use your own shampoo and face wash. This helps your skin to be as bacteria free as possible.   Do not apply any deodorant, lotion or powder after you shower.   DO NOT remove hair from the surgery site.  Do not shave the incision site unless you are given specific instructions to do so.    Sleep in a bed with clean sheets.  Do not sleep with a pet in the bed.   If you wear contact lenses, dentures, hearing aids or glasses, bring a container to put them in during surgery and give to a family member for safe keeping.    Please leave all jewelry, piercing's and valuables  at home.           If your doctor has scheduled you for an overnight stay, bring a small overnight bag with any personal items you need.  Wear comfortable clothing that is easy to remove and place back on after surgery.     Make arrangements in advance for transportation home by a responsible adult.    You must make arrangements for transportation, TAXI'S, UBER'S OR LYFTS ARE NOT ALLOWED.      If you have any questions about these instructions, call Pre-Op Admit  Nursing at [phone] , Pre-Op Day Surgery Unit at [phone]

## (undated) DEVICE — SLEEVE SCD EXPRESS CALF MEDIUM

## (undated) DEVICE — SEE MEDLINE ITEM 157117

## (undated) DEVICE — GUIDE WIRE MOTION .035 X 150CM

## (undated) DEVICE — SYR ONLY LUER LOCK 20CC

## (undated) DEVICE — SOL IRR WATER STRL 3000 ML

## (undated) DEVICE — BASKET STNE RTRVL HLC 3F 4WR O

## (undated) DEVICE — SCRUB HIBICLENS 4% CHG 4OZ

## (undated) DEVICE — CATH CONE TIP

## (undated) DEVICE — GAUZE SPONGE BULKEE 6X6.75IN

## (undated) DEVICE — Device

## (undated) DEVICE — GLOVE SURG ULTRA TOUCH 7.5

## (undated) DEVICE — CATH BLLN UROMAX 14F 4CM

## (undated) DEVICE — SEE MEDLINE ITEM 152487

## (undated) DEVICE — SEE MEDLINE ITEM 157185

## (undated) DEVICE — CONNECTOR CATH URTRL W/O LATEX

## (undated) DEVICE — CATH POLLACK OPEN-END FLEXI-TI

## (undated) DEVICE — SOL IRR NACL .9% 3000ML